# Patient Record
Sex: MALE | Race: WHITE | NOT HISPANIC OR LATINO | URBAN - METROPOLITAN AREA
[De-identification: names, ages, dates, MRNs, and addresses within clinical notes are randomized per-mention and may not be internally consistent; named-entity substitution may affect disease eponyms.]

---

## 2018-01-09 ENCOUNTER — ON CAMPUS - OUTPATIENT (AMBULATORY)
Dept: URBAN - METROPOLITAN AREA HOSPITAL 2 | Facility: HOSPITAL | Age: 78
End: 2018-01-09

## 2018-01-09 VITALS
HEART RATE: 53 BPM | HEART RATE: 51 BPM | HEART RATE: 52 BPM | TEMPERATURE: 97.7 F | DIASTOLIC BLOOD PRESSURE: 70 MMHG | HEART RATE: 55 BPM | HEIGHT: 66 IN | WEIGHT: 134.5 LBS | SYSTOLIC BLOOD PRESSURE: 141 MMHG | HEART RATE: 61 BPM | HEART RATE: 49 BPM | SYSTOLIC BLOOD PRESSURE: 142 MMHG | OXYGEN SATURATION: 100 % | DIASTOLIC BLOOD PRESSURE: 60 MMHG | DIASTOLIC BLOOD PRESSURE: 72 MMHG | DIASTOLIC BLOOD PRESSURE: 63 MMHG | HEART RATE: 57 BPM | SYSTOLIC BLOOD PRESSURE: 139 MMHG | SYSTOLIC BLOOD PRESSURE: 134 MMHG | RESPIRATION RATE: 16 BRPM | SYSTOLIC BLOOD PRESSURE: 151 MMHG | DIASTOLIC BLOOD PRESSURE: 45 MMHG | SYSTOLIC BLOOD PRESSURE: 123 MMHG | SYSTOLIC BLOOD PRESSURE: 118 MMHG | RESPIRATION RATE: 18 BRPM | RESPIRATION RATE: 17 BRPM | DIASTOLIC BLOOD PRESSURE: 59 MMHG

## 2018-01-09 DIAGNOSIS — K57.30 DIVERTICULOSIS OF LARGE INTESTINE WITHOUT PERFORATION OR ABS: ICD-10-CM

## 2018-01-09 DIAGNOSIS — D50.0 IRON DEFICIENCY ANEMIA SECONDARY TO BLOOD LOSS (CHRONIC): ICD-10-CM

## 2018-01-09 DIAGNOSIS — R19.4 CHANGE IN BOWEL HABIT: ICD-10-CM

## 2018-01-09 PROCEDURE — 45378 DIAGNOSTIC COLONOSCOPY: CPT

## 2018-01-09 PROCEDURE — 43235 EGD DIAGNOSTIC BRUSH WASH: CPT

## 2018-01-09 RX ADMIN — PROPOFOL: 10 INJECTION, EMULSION INTRAVENOUS at 08:40

## 2018-01-18 ENCOUNTER — OFFICE (AMBULATORY)
Dept: URBAN - METROPOLITAN AREA CLINIC 64 | Facility: CLINIC | Age: 78
End: 2018-01-18

## 2018-01-18 DIAGNOSIS — R19.4 CHANGE IN BOWEL HABIT: ICD-10-CM

## 2018-01-18 DIAGNOSIS — H02.60 XANTHELASMA OF UNSPECIFIED EYE, UNSPECIFIED EYELID: ICD-10-CM

## 2018-01-18 DIAGNOSIS — D50.9 IRON DEFICIENCY ANEMIA, UNSPECIFIED: ICD-10-CM

## 2018-01-18 DIAGNOSIS — Z98.890 OTHER SPECIFIED POSTPROCEDURAL STATES: ICD-10-CM

## 2018-01-18 DIAGNOSIS — Q27.33 ARTERIOVENOUS MALFORMATION OF DIGESTIVE SYSTEM VESSEL: ICD-10-CM

## 2018-01-18 PROCEDURE — 91110 GI TRC IMG INTRAL ESOPH-ILE: CPT

## 2019-11-15 ENCOUNTER — TRANSCRIBE ORDERS (OUTPATIENT)
Dept: ADMINISTRATIVE | Facility: HOSPITAL | Age: 79
End: 2019-11-15

## 2019-11-15 ENCOUNTER — HOSPITAL ENCOUNTER (OUTPATIENT)
Dept: CARDIOLOGY | Facility: HOSPITAL | Age: 79
Discharge: HOME | End: 2019-11-15
Admitting: ORTHOPAEDIC SURGERY

## 2019-11-15 ENCOUNTER — HOSPITAL ENCOUNTER (OUTPATIENT)
Dept: GENERAL RADIOLOGY | Facility: HOSPITAL | Age: 79
Discharge: HOME | End: 2019-11-15

## 2019-11-15 ENCOUNTER — LAB (OUTPATIENT)
Dept: LAB | Facility: HOSPITAL | Age: 79
End: 2019-11-15

## 2019-11-15 DIAGNOSIS — Z01.818 PRE-OP TESTING: ICD-10-CM

## 2019-11-15 DIAGNOSIS — Z01.818 PRE-OP TESTING: Primary | ICD-10-CM

## 2019-11-15 LAB
ABO GROUP BLD: NORMAL
ANION GAP SERPL CALCULATED.3IONS-SCNC: 13.1 MMOL/L (ref 5–15)
BASOPHILS # BLD AUTO: 0.07 10*3/MM3 (ref 0–0.2)
BASOPHILS NFR BLD AUTO: 1.3 % (ref 0–1.5)
BLD GP AB SCN SERPL QL: NEGATIVE
BUN BLD-MCNC: 17 MG/DL (ref 8–23)
BUN/CREAT SERPL: 15.6 (ref 7–25)
CALCIUM SPEC-SCNC: 9.7 MG/DL (ref 8.6–10.5)
CHLORIDE SERPL-SCNC: 102 MMOL/L (ref 98–107)
CO2 SERPL-SCNC: 26.9 MMOL/L (ref 22–29)
CREAT BLD-MCNC: 1.09 MG/DL (ref 0.76–1.27)
DEPRECATED RDW RBC AUTO: 43.6 FL (ref 37–54)
EOSINOPHIL # BLD AUTO: 0.15 10*3/MM3 (ref 0–0.4)
EOSINOPHIL NFR BLD AUTO: 2.7 % (ref 0.3–6.2)
ERYTHROCYTE [DISTWIDTH] IN BLOOD BY AUTOMATED COUNT: 12.9 % (ref 12.3–15.4)
GFR SERPL CREATININE-BSD FRML MDRD: 65 ML/MIN/1.73
GLUCOSE BLD-MCNC: 82 MG/DL (ref 65–99)
HCT VFR BLD AUTO: 39.9 % (ref 37.5–51)
HGB BLD-MCNC: 12.9 G/DL (ref 13–17.7)
IMM GRANULOCYTES # BLD AUTO: 0.03 10*3/MM3 (ref 0–0.05)
IMM GRANULOCYTES NFR BLD AUTO: 0.5 % (ref 0–0.5)
LYMPHOCYTES # BLD AUTO: 0.73 10*3/MM3 (ref 0.7–3.1)
LYMPHOCYTES NFR BLD AUTO: 13.1 % (ref 19.6–45.3)
MCH RBC QN AUTO: 29.9 PG (ref 26.6–33)
MCHC RBC AUTO-ENTMCNC: 32.3 G/DL (ref 31.5–35.7)
MCV RBC AUTO: 92.4 FL (ref 79–97)
MONOCYTES # BLD AUTO: 0.53 10*3/MM3 (ref 0.1–0.9)
MONOCYTES NFR BLD AUTO: 9.5 % (ref 5–12)
NEUTROPHILS # BLD AUTO: 4.08 10*3/MM3 (ref 1.7–7)
NEUTROPHILS NFR BLD AUTO: 72.9 % (ref 42.7–76)
NRBC BLD AUTO-RTO: 0 /100 WBC (ref 0–0.2)
PLATELET # BLD AUTO: 221 10*3/MM3 (ref 140–450)
PMV BLD AUTO: 10 FL (ref 6–12)
POTASSIUM BLD-SCNC: 4.2 MMOL/L (ref 3.5–5.2)
RBC # BLD AUTO: 4.32 10*6/MM3 (ref 4.14–5.8)
RH BLD: POSITIVE
SODIUM BLD-SCNC: 142 MMOL/L (ref 136–145)
T&S EXPIRATION DATE: NORMAL
WBC NRBC COR # BLD: 5.59 10*3/MM3 (ref 3.4–10.8)

## 2019-11-15 PROCEDURE — 36415 COLL VENOUS BLD VENIPUNCTURE: CPT

## 2019-11-15 PROCEDURE — 80048 BASIC METABOLIC PNL TOTAL CA: CPT

## 2019-11-15 PROCEDURE — 86901 BLOOD TYPING SEROLOGIC RH(D): CPT

## 2019-11-15 PROCEDURE — 85025 COMPLETE CBC W/AUTO DIFF WBC: CPT

## 2019-11-15 PROCEDURE — 86850 RBC ANTIBODY SCREEN: CPT | Performed by: ORTHOPAEDIC SURGERY

## 2019-11-15 PROCEDURE — 86900 BLOOD TYPING SEROLOGIC ABO: CPT

## 2019-11-15 PROCEDURE — 93005 ELECTROCARDIOGRAM TRACING: CPT | Performed by: ORTHOPAEDIC SURGERY

## 2019-11-15 PROCEDURE — 86900 BLOOD TYPING SEROLOGIC ABO: CPT | Performed by: ORTHOPAEDIC SURGERY

## 2019-11-15 PROCEDURE — 86901 BLOOD TYPING SEROLOGIC RH(D): CPT | Performed by: ORTHOPAEDIC SURGERY

## 2019-11-15 PROCEDURE — 71046 X-RAY EXAM CHEST 2 VIEWS: CPT

## 2019-12-01 PROCEDURE — 93010 ELECTROCARDIOGRAM REPORT: CPT | Performed by: INTERNAL MEDICINE

## 2020-02-25 ENCOUNTER — LAB (OUTPATIENT)
Dept: LAB | Facility: HOSPITAL | Age: 80
End: 2020-02-25

## 2020-02-25 ENCOUNTER — HOSPITAL ENCOUNTER (OUTPATIENT)
Dept: CARDIOLOGY | Facility: HOSPITAL | Age: 80
Discharge: HOME | End: 2020-02-25
Admitting: SURGERY

## 2020-02-25 ENCOUNTER — TRANSCRIBE ORDERS (OUTPATIENT)
Dept: ADMINISTRATIVE | Facility: HOSPITAL | Age: 80
End: 2020-02-25

## 2020-02-25 DIAGNOSIS — Z01.818 PRE-OP TESTING: ICD-10-CM

## 2020-02-25 DIAGNOSIS — Z01.818 PRE-OP TESTING: Primary | ICD-10-CM

## 2020-02-25 LAB
ANION GAP SERPL CALCULATED.3IONS-SCNC: 10.6 MMOL/L (ref 5–15)
BASOPHILS # BLD AUTO: 0.06 10*3/MM3 (ref 0–0.2)
BASOPHILS NFR BLD AUTO: 1.3 % (ref 0–1.5)
BUN BLD-MCNC: 20 MG/DL (ref 8–23)
BUN/CREAT SERPL: 15.7 (ref 7–25)
CALCIUM SPEC-SCNC: 9 MG/DL (ref 8.6–10.5)
CHLORIDE SERPL-SCNC: 98 MMOL/L (ref 98–107)
CO2 SERPL-SCNC: 30.4 MMOL/L (ref 22–29)
CREAT BLD-MCNC: 1.27 MG/DL (ref 0.76–1.27)
DEPRECATED RDW RBC AUTO: 46.6 FL (ref 37–54)
EOSINOPHIL # BLD AUTO: 0.14 10*3/MM3 (ref 0–0.4)
EOSINOPHIL NFR BLD AUTO: 2.9 % (ref 0.3–6.2)
ERYTHROCYTE [DISTWIDTH] IN BLOOD BY AUTOMATED COUNT: 14.4 % (ref 12.3–15.4)
GFR SERPL CREATININE-BSD FRML MDRD: 55 ML/MIN/1.73
GLUCOSE BLD-MCNC: 117 MG/DL (ref 65–99)
HCT VFR BLD AUTO: 35.2 % (ref 37.5–51)
HGB BLD-MCNC: 11.3 G/DL (ref 13–17.7)
IMM GRANULOCYTES # BLD AUTO: 0.01 10*3/MM3 (ref 0–0.05)
IMM GRANULOCYTES NFR BLD AUTO: 0.2 % (ref 0–0.5)
LYMPHOCYTES # BLD AUTO: 0.92 10*3/MM3 (ref 0.7–3.1)
LYMPHOCYTES NFR BLD AUTO: 19.3 % (ref 19.6–45.3)
MCH RBC QN AUTO: 28.9 PG (ref 26.6–33)
MCHC RBC AUTO-ENTMCNC: 32.1 G/DL (ref 31.5–35.7)
MCV RBC AUTO: 90 FL (ref 79–97)
MONOCYTES # BLD AUTO: 0.45 10*3/MM3 (ref 0.1–0.9)
MONOCYTES NFR BLD AUTO: 9.4 % (ref 5–12)
NEUTROPHILS # BLD AUTO: 3.19 10*3/MM3 (ref 1.7–7)
NEUTROPHILS NFR BLD AUTO: 66.9 % (ref 42.7–76)
NRBC BLD AUTO-RTO: 0 /100 WBC (ref 0–0.2)
PLATELET # BLD AUTO: 232 10*3/MM3 (ref 140–450)
PMV BLD AUTO: 8.9 FL (ref 6–12)
POTASSIUM BLD-SCNC: 4.2 MMOL/L (ref 3.5–5.2)
RBC # BLD AUTO: 3.91 10*6/MM3 (ref 4.14–5.8)
SODIUM BLD-SCNC: 139 MMOL/L (ref 136–145)
WBC NRBC COR # BLD: 4.77 10*3/MM3 (ref 3.4–10.8)

## 2020-02-25 PROCEDURE — 80048 BASIC METABOLIC PNL TOTAL CA: CPT

## 2020-02-25 PROCEDURE — 36415 COLL VENOUS BLD VENIPUNCTURE: CPT

## 2020-02-25 PROCEDURE — 85025 COMPLETE CBC W/AUTO DIFF WBC: CPT

## 2020-02-25 PROCEDURE — 93005 ELECTROCARDIOGRAM TRACING: CPT | Performed by: SURGERY

## 2020-03-27 PROCEDURE — 93010 ELECTROCARDIOGRAM REPORT: CPT | Performed by: INTERNAL MEDICINE

## 2022-06-09 ENCOUNTER — OFFICE (AMBULATORY)
Dept: URBAN - METROPOLITAN AREA CLINIC 64 | Facility: CLINIC | Age: 82
End: 2022-06-09

## 2022-06-09 VITALS
DIASTOLIC BLOOD PRESSURE: 90 MMHG | HEART RATE: 62 BPM | WEIGHT: 137 LBS | SYSTOLIC BLOOD PRESSURE: 160 MMHG | HEIGHT: 66 IN

## 2022-06-09 DIAGNOSIS — R15.9 FULL INCONTINENCE OF FECES: ICD-10-CM

## 2022-06-09 DIAGNOSIS — R14.3 FLATULENCE: ICD-10-CM

## 2022-06-09 PROCEDURE — 99204 OFFICE O/P NEW MOD 45 MIN: CPT | Performed by: INTERNAL MEDICINE

## 2022-06-09 RX ORDER — SORBITOL SOLUTION 70 %
SOLUTION, ORAL MISCELLANEOUS
Qty: 100 | Refills: 0 | Status: COMPLETED
Start: 2022-06-09 | End: 2022-09-06

## 2022-09-06 ENCOUNTER — OFFICE (AMBULATORY)
Dept: URBAN - METROPOLITAN AREA CLINIC 64 | Facility: CLINIC | Age: 82
End: 2022-09-06

## 2022-09-06 VITALS
DIASTOLIC BLOOD PRESSURE: 89 MMHG | WEIGHT: 136 LBS | SYSTOLIC BLOOD PRESSURE: 169 MMHG | HEIGHT: 66 IN | HEART RATE: 61 BPM

## 2022-09-06 DIAGNOSIS — R15.9 FULL INCONTINENCE OF FECES: ICD-10-CM

## 2022-09-06 PROCEDURE — 99214 OFFICE O/P EST MOD 30 MIN: CPT | Performed by: INTERNAL MEDICINE

## 2022-12-06 ENCOUNTER — OFFICE (AMBULATORY)
Dept: URBAN - METROPOLITAN AREA CLINIC 64 | Facility: CLINIC | Age: 82
End: 2022-12-06

## 2022-12-06 VITALS
DIASTOLIC BLOOD PRESSURE: 100 MMHG | HEART RATE: 71 BPM | WEIGHT: 140 LBS | SYSTOLIC BLOOD PRESSURE: 188 MMHG | HEIGHT: 66 IN

## 2022-12-06 DIAGNOSIS — K59.00 CONSTIPATION, UNSPECIFIED: ICD-10-CM

## 2022-12-06 DIAGNOSIS — R15.9 FULL INCONTINENCE OF FECES: ICD-10-CM

## 2022-12-06 PROCEDURE — 99213 OFFICE O/P EST LOW 20 MIN: CPT | Performed by: INTERNAL MEDICINE

## 2023-03-14 ENCOUNTER — OFFICE (AMBULATORY)
Dept: URBAN - METROPOLITAN AREA CLINIC 64 | Facility: CLINIC | Age: 83
End: 2023-03-14

## 2023-03-14 VITALS
SYSTOLIC BLOOD PRESSURE: 184 MMHG | HEIGHT: 66 IN | WEIGHT: 134 LBS | DIASTOLIC BLOOD PRESSURE: 99 MMHG | HEART RATE: 61 BPM

## 2023-03-14 DIAGNOSIS — R15.9 FULL INCONTINENCE OF FECES: ICD-10-CM

## 2023-03-14 DIAGNOSIS — K59.00 CONSTIPATION, UNSPECIFIED: ICD-10-CM

## 2023-03-14 PROCEDURE — 99213 OFFICE O/P EST LOW 20 MIN: CPT | Performed by: NURSE PRACTITIONER

## 2023-07-25 ENCOUNTER — APPOINTMENT (OUTPATIENT)
Dept: GENERAL RADIOLOGY | Facility: HOSPITAL | Age: 83
End: 2023-07-25
Payer: MEDICARE

## 2023-07-25 ENCOUNTER — HOSPITAL ENCOUNTER (EMERGENCY)
Facility: HOSPITAL | Age: 83
Discharge: HOME | End: 2023-07-25
Attending: EMERGENCY MEDICINE | Admitting: EMERGENCY MEDICINE
Payer: MEDICARE

## 2023-07-25 ENCOUNTER — APPOINTMENT (OUTPATIENT)
Dept: CT IMAGING | Facility: HOSPITAL | Age: 83
End: 2023-07-25
Payer: MEDICARE

## 2023-07-25 VITALS
WEIGHT: 133.6 LBS | DIASTOLIC BLOOD PRESSURE: 90 MMHG | HEART RATE: 92 BPM | RESPIRATION RATE: 18 BRPM | OXYGEN SATURATION: 98 % | HEIGHT: 67 IN | SYSTOLIC BLOOD PRESSURE: 176 MMHG | BODY MASS INDEX: 20.97 KG/M2 | TEMPERATURE: 98.1 F

## 2023-07-25 DIAGNOSIS — S09.90XA INJURY OF HEAD, INITIAL ENCOUNTER: ICD-10-CM
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
## 2023-07-25 DIAGNOSIS — M79.642 LEFT HAND PAIN: ICD-10-CM

## 2023-07-25 DIAGNOSIS — S80.211A ABRASION OF RIGHT KNEE, INITIAL ENCOUNTER: ICD-10-CM

## 2023-07-25 DIAGNOSIS — S01.81XA LACERATION OF FOREHEAD, INITIAL ENCOUNTER: ICD-10-CM

## 2023-07-25 PROCEDURE — 70450 CT HEAD/BRAIN W/O DYE: CPT

## 2023-07-25 PROCEDURE — 99282 EMERGENCY DEPT VISIT SF MDM: CPT

## 2023-07-25 PROCEDURE — 73562 X-RAY EXAM OF KNEE 3: CPT

## 2023-07-25 PROCEDURE — 72125 CT NECK SPINE W/O DYE: CPT

## 2023-07-25 PROCEDURE — 73130 X-RAY EXAM OF HAND: CPT

## 2023-08-22 ENCOUNTER — OFFICE (AMBULATORY)
Dept: URBAN - METROPOLITAN AREA CLINIC 64 | Facility: CLINIC | Age: 83
End: 2023-08-22

## 2023-08-22 VITALS
SYSTOLIC BLOOD PRESSURE: 163 MMHG | WEIGHT: 133 LBS | HEART RATE: 66 BPM | HEIGHT: 66 IN | DIASTOLIC BLOOD PRESSURE: 90 MMHG

## 2023-08-22 DIAGNOSIS — K59.00 CONSTIPATION, UNSPECIFIED: ICD-10-CM

## 2023-08-22 DIAGNOSIS — K30 FUNCTIONAL DYSPEPSIA: ICD-10-CM

## 2023-08-22 DIAGNOSIS — R15.9 FULL INCONTINENCE OF FECES: ICD-10-CM

## 2023-08-22 PROCEDURE — 99214 OFFICE O/P EST MOD 30 MIN: CPT | Performed by: INTERNAL MEDICINE

## 2023-08-22 RX ORDER — MIRTAZAPINE 15 MG/1
TABLET, FILM COATED ORAL
Qty: 30 | Refills: 1 | Status: ACTIVE
Start: 2023-08-22

## 2023-10-25 ENCOUNTER — OFFICE (AMBULATORY)
Dept: URBAN - METROPOLITAN AREA CLINIC 64 | Facility: CLINIC | Age: 83
End: 2023-10-25

## 2023-10-25 VITALS
DIASTOLIC BLOOD PRESSURE: 87 MMHG | HEART RATE: 59 BPM | HEIGHT: 66 IN | WEIGHT: 126 LBS | SYSTOLIC BLOOD PRESSURE: 143 MMHG

## 2023-10-25 DIAGNOSIS — K59.00 CONSTIPATION, UNSPECIFIED: ICD-10-CM

## 2023-10-25 DIAGNOSIS — R63.4 ABNORMAL WEIGHT LOSS: ICD-10-CM

## 2023-10-25 DIAGNOSIS — R15.9 FULL INCONTINENCE OF FECES: ICD-10-CM

## 2023-10-25 DIAGNOSIS — R10.13 EPIGASTRIC PAIN: ICD-10-CM

## 2023-10-25 PROCEDURE — 99214 OFFICE O/P EST MOD 30 MIN: CPT | Performed by: INTERNAL MEDICINE

## 2023-11-30 ENCOUNTER — INPATIENT HOSPITAL (AMBULATORY)
Dept: URBAN - METROPOLITAN AREA HOSPITAL 76 | Facility: HOSPITAL | Age: 83
End: 2023-11-30
Payer: COMMERCIAL

## 2023-11-30 DIAGNOSIS — D64.9 ANEMIA, UNSPECIFIED: ICD-10-CM

## 2023-11-30 PROCEDURE — 99222 1ST HOSP IP/OBS MODERATE 55: CPT | Mod: FS

## 2023-12-01 ENCOUNTER — INPATIENT HOSPITAL (AMBULATORY)
Dept: URBAN - METROPOLITAN AREA HOSPITAL 76 | Facility: HOSPITAL | Age: 83
End: 2023-12-01
Payer: COMMERCIAL

## 2023-12-01 DIAGNOSIS — K59.00 CONSTIPATION, UNSPECIFIED: ICD-10-CM

## 2023-12-01 DIAGNOSIS — D64.9 ANEMIA, UNSPECIFIED: ICD-10-CM

## 2023-12-01 DIAGNOSIS — R06.02 SHORTNESS OF BREATH: ICD-10-CM

## 2023-12-01 PROCEDURE — 99232 SBSQ HOSP IP/OBS MODERATE 35: CPT | Mod: FS

## 2024-07-31 ENCOUNTER — HOSPITAL ENCOUNTER (INPATIENT)
Facility: HOSPITAL | Age: 84
LOS: 3 days | Discharge: NURSING, REHAB OR LONG-TERM CARE | End: 2024-08-06
Attending: EMERGENCY MEDICINE | Admitting: STUDENT IN AN ORGANIZED HEALTH CARE EDUCATION/TRAINING PROGRAM
Payer: MEDICARE

## 2024-07-31 DIAGNOSIS — I50.9 ACUTE ON CHRONIC CONGESTIVE HEART FAILURE, UNSPECIFIED HEART FAILURE TYPE: ICD-10-CM

## 2024-07-31 DIAGNOSIS — R41.82 ALTERED MENTAL STATUS, UNSPECIFIED ALTERED MENTAL STATUS TYPE: Primary | ICD-10-CM

## 2024-07-31 PROCEDURE — 99285 EMERGENCY DEPT VISIT HI MDM: CPT

## 2024-07-31 PROCEDURE — 36415 COLL VENOUS BLD VENIPUNCTURE: CPT

## 2024-07-31 PROCEDURE — 94761 N-INVAS EAR/PLS OXIMETRY MLT: CPT

## 2024-07-31 PROCEDURE — P9612 CATHETERIZE FOR URINE SPEC: HCPCS

## 2024-07-31 RX ORDER — SODIUM CHLORIDE 0.9 % (FLUSH) 0.9 %
10 SYRINGE (ML) INJECTION AS NEEDED
Status: DISCONTINUED | OUTPATIENT
Start: 2024-07-31 | End: 2024-08-06 | Stop reason: HOSPADM

## 2024-08-01 ENCOUNTER — APPOINTMENT (OUTPATIENT)
Dept: CT IMAGING | Facility: HOSPITAL | Age: 84
End: 2024-08-01
Payer: MEDICARE

## 2024-08-01 ENCOUNTER — APPOINTMENT (OUTPATIENT)
Dept: GENERAL RADIOLOGY | Facility: HOSPITAL | Age: 84
End: 2024-08-01
Payer: MEDICARE

## 2024-08-01 ENCOUNTER — APPOINTMENT (OUTPATIENT)
Dept: CARDIOLOGY | Facility: HOSPITAL | Age: 84
End: 2024-08-01
Payer: MEDICARE

## 2024-08-01 PROBLEM — I25.10 CAD (CORONARY ARTERY DISEASE): Status: ACTIVE | Noted: 2024-08-01

## 2024-08-01 PROBLEM — I50.9 HEART FAILURE, UNSPECIFIED: Status: ACTIVE | Noted: 2024-06-27

## 2024-08-01 PROBLEM — I50.9 CHF EXACERBATION: Status: ACTIVE | Noted: 2024-08-01

## 2024-08-01 LAB
ACANTHOCYTES BLD QL SMEAR: NORMAL
ALBUMIN SERPL-MCNC: 3.5 G/DL (ref 3.5–5.2)
ALBUMIN/GLOB SERPL: 1.3 G/DL
ALP SERPL-CCNC: 149 U/L (ref 39–117)
ALT SERPL W P-5'-P-CCNC: 10 U/L (ref 1–41)
ANION GAP SERPL CALCULATED.3IONS-SCNC: 10.6 MMOL/L (ref 5–15)
ANION GAP SERPL CALCULATED.3IONS-SCNC: 9.5 MMOL/L (ref 5–15)
AORTIC DIMENSIONLESS INDEX: 0.24 (DI)
ARTERIAL PATENCY WRIST A: POSITIVE
AST SERPL-CCNC: 32 U/L (ref 1–40)
ATMOSPHERIC PRESS: ABNORMAL MM[HG]
BACTERIA UR QL AUTO: ABNORMAL /HPF
BASE EXCESS BLDA CALC-SCNC: 0.2 MMOL/L (ref 0–3)
BASOPHILS # BLD AUTO: 0.02 10*3/MM3 (ref 0–0.2)
BASOPHILS NFR BLD AUTO: 0.2 % (ref 0–1.5)
BDY SITE: ABNORMAL
BH CV ECHO LEFT VENTRICLE GLOBAL LONGITUDINAL STRAIN: -8.4 %
BH CV ECHO MEAS - ACS: 0.9 CM
BH CV ECHO MEAS - AO MAX PG: 29.8 MMHG
BH CV ECHO MEAS - AO MEAN PG: 17 MMHG
BH CV ECHO MEAS - AO V2 MAX: 273 CM/SEC
BH CV ECHO MEAS - AO V2 VTI: 67 CM
BH CV ECHO MEAS - AVA(I,D): 0.73 CM2
BH CV ECHO MEAS - EDV(CUBED): 140.6 ML
BH CV ECHO MEAS - EDV(MOD-SP2): 79.1 ML
BH CV ECHO MEAS - EDV(MOD-SP4): 80.8 ML
BH CV ECHO MEAS - EF(MOD-BP): 33.9 %
BH CV ECHO MEAS - EF(MOD-SP2): 35.3 %
BH CV ECHO MEAS - EF(MOD-SP4): 34.5 %
BH CV ECHO MEAS - ESV(CUBED): 97.3 ML
BH CV ECHO MEAS - ESV(MOD-SP2): 51.2 ML
BH CV ECHO MEAS - ESV(MOD-SP4): 52.9 ML
BH CV ECHO MEAS - FS: 11.5 %
BH CV ECHO MEAS - IVS/LVPW: 1.33 CM
BH CV ECHO MEAS - IVSD: 1.2 CM
BH CV ECHO MEAS - LA DIMENSION: 4.5 CM
BH CV ECHO MEAS - LAT PEAK E' VEL: 5.1 CM/SEC
BH CV ECHO MEAS - LV DIASTOLIC VOL/BSA (35-75): 48.5 CM2
BH CV ECHO MEAS - LV MASS(C)D: 207.3 GRAMS
BH CV ECHO MEAS - LV MAX PG: 1.73 MMHG
BH CV ECHO MEAS - LV MEAN PG: 1 MMHG
BH CV ECHO MEAS - LV SYSTOLIC VOL/BSA (12-30): 31.7 CM2
BH CV ECHO MEAS - LV V1 MAX: 65.7 CM/SEC
BH CV ECHO MEAS - LV V1 VTI: 15.6 CM
BH CV ECHO MEAS - LVIDD: 5.2 CM
BH CV ECHO MEAS - LVIDS: 4.6 CM
BH CV ECHO MEAS - LVOT AREA: 3.1 CM2
BH CV ECHO MEAS - LVOT DIAM: 2 CM
BH CV ECHO MEAS - LVPWD: 0.9 CM
BH CV ECHO MEAS - MED PEAK E' VEL: 4.7 CM/SEC
BH CV ECHO MEAS - MR MAX PG: 97.6 MMHG
BH CV ECHO MEAS - MR MAX VEL: 494 CM/SEC
BH CV ECHO MEAS - MR MEAN PG: 61 MMHG
BH CV ECHO MEAS - MR MEAN VEL: 367 CM/SEC
BH CV ECHO MEAS - MR VTI: 186 CM
BH CV ECHO MEAS - MV A MAX VEL: 55.9 CM/SEC
BH CV ECHO MEAS - MV DEC SLOPE: 109 CM/SEC2
BH CV ECHO MEAS - MV DEC TIME: 0.27 SEC
BH CV ECHO MEAS - MV E MAX VEL: 49.1 CM/SEC
BH CV ECHO MEAS - MV E/A: 0.88
BH CV ECHO MEAS - MV MAX PG: 1.61 MMHG
BH CV ECHO MEAS - MV MEAN PG: 1 MMHG
BH CV ECHO MEAS - MV P1/2T: 177.9 MSEC
BH CV ECHO MEAS - MV V2 VTI: 29.2 CM
BH CV ECHO MEAS - MVA(P1/2T): 1.24 CM2
BH CV ECHO MEAS - MVA(VTI): 1.68 CM2
BH CV ECHO MEAS - PA ACC TIME: 0.1 SEC
BH CV ECHO MEAS - PA V2 MAX: 87.3 CM/SEC
BH CV ECHO MEAS - PI END-D VEL: 119.5 CM/SEC
BH CV ECHO MEAS - PULM A REVS DUR: 0.12 SEC
BH CV ECHO MEAS - PULM A REVS VEL: 14.4 CM/SEC
BH CV ECHO MEAS - PULM DIAS VEL: 28.9 CM/SEC
BH CV ECHO MEAS - PULM S/D: 1.29
BH CV ECHO MEAS - PULM SYS VEL: 37.4 CM/SEC
BH CV ECHO MEAS - RAP SYSTOLE: 8 MMHG
BH CV ECHO MEAS - RV MAX PG: 2.07 MMHG
BH CV ECHO MEAS - RV V1 MAX: 71.9 CM/SEC
BH CV ECHO MEAS - RV V1 VTI: 15 CM
BH CV ECHO MEAS - RVDD: 3.1 CM
BH CV ECHO MEAS - RVSP: 36.7 MMHG
BH CV ECHO MEAS - SV(LVOT): 49 ML
BH CV ECHO MEAS - SV(MOD-SP2): 27.9 ML
BH CV ECHO MEAS - SV(MOD-SP4): 27.9 ML
BH CV ECHO MEAS - SVI(LVOT): 29.4 ML/M2
BH CV ECHO MEAS - SVI(MOD-SP2): 16.7 ML/M2
BH CV ECHO MEAS - SVI(MOD-SP4): 16.7 ML/M2
BH CV ECHO MEAS - TAPSE (>1.6): 1.56 CM
BH CV ECHO MEAS - TR MAX PG: 28.7 MMHG
BH CV ECHO MEAS - TR MAX VEL: 268 CM/SEC
BH CV ECHO MEASUREMENTS AVERAGE E/E' RATIO: 10.02
BH CV XLRA - TDI S': 9.4 CM/SEC
BILIRUB SERPL-MCNC: 0.7 MG/DL (ref 0–1.2)
BILIRUB UR QL STRIP: NEGATIVE
BUN SERPL-MCNC: 22 MG/DL (ref 8–23)
BUN SERPL-MCNC: 22 MG/DL (ref 8–23)
BUN/CREAT SERPL: 15.6 (ref 7–25)
BUN/CREAT SERPL: 17.5 (ref 7–25)
BURR CELLS BLD QL SMEAR: NORMAL
CALCIUM SPEC-SCNC: 8.1 MG/DL (ref 8.6–10.5)
CALCIUM SPEC-SCNC: 9.1 MG/DL (ref 8.6–10.5)
CHLORIDE SERPL-SCNC: 104 MMOL/L (ref 98–107)
CHLORIDE SERPL-SCNC: 106 MMOL/L (ref 98–107)
CLARITY UR: CLEAR
CO2 BLDA-SCNC: 27.9 MMOL/L (ref 22–29)
CO2 SERPL-SCNC: 24.4 MMOL/L (ref 22–29)
CO2 SERPL-SCNC: 25.5 MMOL/L (ref 22–29)
COLOR UR: ABNORMAL
CREAT SERPL-MCNC: 1.26 MG/DL (ref 0.76–1.27)
CREAT SERPL-MCNC: 1.41 MG/DL (ref 0.76–1.27)
D-LACTATE SERPL-SCNC: 1.6 MMOL/L (ref 0.3–2)
DEPRECATED RDW RBC AUTO: 55.4 FL (ref 37–54)
EGFRCR SERPLBLD CKD-EPI 2021: 48.8 ML/MIN/1.73
EGFRCR SERPLBLD CKD-EPI 2021: 55.9 ML/MIN/1.73
EOSINOPHIL # BLD AUTO: 0.03 10*3/MM3 (ref 0–0.4)
EOSINOPHIL NFR BLD AUTO: 0.3 % (ref 0.3–6.2)
ERYTHROCYTE [DISTWIDTH] IN BLOOD BY AUTOMATED COUNT: 15.7 % (ref 12.3–15.4)
GEN 5 2HR TROPONIN T REFLEX: 137 NG/L
GLOBULIN UR ELPH-MCNC: 2.6 GM/DL
GLUCOSE SERPL-MCNC: 85 MG/DL (ref 65–99)
GLUCOSE SERPL-MCNC: 86 MG/DL (ref 65–99)
GLUCOSE UR STRIP-MCNC: NEGATIVE MG/DL
HCO3 BLDA-SCNC: 26.4 MMOL/L (ref 21–28)
HCT VFR BLD AUTO: 37.1 % (ref 37.5–51)
HEMODILUTION: NO
HGB BLD-MCNC: 11.4 G/DL (ref 13–17.7)
HGB UR QL STRIP.AUTO: ABNORMAL
HOLD SPECIMEN: NORMAL
HOLD SPECIMEN: NORMAL
HYALINE CASTS UR QL AUTO: ABNORMAL /LPF
IMM GRANULOCYTES # BLD AUTO: 0.04 10*3/MM3 (ref 0–0.05)
IMM GRANULOCYTES NFR BLD AUTO: 0.4 % (ref 0–0.5)
INHALED O2 CONCENTRATION: 36 %
KETONES UR QL STRIP: ABNORMAL
LEFT ATRIUM VOLUME INDEX: 29.7 ML/M2
LEUKOCYTE ESTERASE UR QL STRIP.AUTO: ABNORMAL
LYMPHOCYTES # BLD AUTO: 0.62 10*3/MM3 (ref 0.7–3.1)
LYMPHOCYTES NFR BLD AUTO: 6.9 % (ref 19.6–45.3)
MCH RBC QN AUTO: 29.6 PG (ref 26.6–33)
MCHC RBC AUTO-ENTMCNC: 30.7 G/DL (ref 31.5–35.7)
MCV RBC AUTO: 96.4 FL (ref 79–97)
MODALITY: ABNORMAL
MONOCYTES # BLD AUTO: 0.44 10*3/MM3 (ref 0.1–0.9)
MONOCYTES NFR BLD AUTO: 4.9 % (ref 5–12)
NEUTROPHILS NFR BLD AUTO: 7.8 10*3/MM3 (ref 1.7–7)
NEUTROPHILS NFR BLD AUTO: 87.3 % (ref 42.7–76)
NITRITE UR QL STRIP: NEGATIVE
NRBC BLD AUTO-RTO: 0 /100 WBC (ref 0–0.2)
NT-PROBNP SERPL-MCNC: ABNORMAL PG/ML (ref 0–1800)
PCO2 BLDA: 48.9 MM HG (ref 35–48)
PH BLDA: 7.34 PH UNITS (ref 7.35–7.45)
PH UR STRIP.AUTO: 5.5 [PH] (ref 5–8)
PLATELET # BLD AUTO: 129 10*3/MM3 (ref 140–450)
PMV BLD AUTO: 10 FL (ref 6–12)
PO2 BLD: 287 MM[HG] (ref 0–500)
PO2 BLDA: 103.4 MM HG (ref 83–108)
POIKILOCYTOSIS BLD QL SMEAR: NORMAL
POTASSIUM SERPL-SCNC: 3.7 MMOL/L (ref 3.5–5.2)
POTASSIUM SERPL-SCNC: 4.2 MMOL/L (ref 3.5–5.2)
PROT SERPL-MCNC: 6.1 G/DL (ref 6–8.5)
PROT UR QL STRIP: ABNORMAL
QT INTERVAL: 461 MS
QTC INTERVAL: 508 MS
RBC # BLD AUTO: 3.85 10*6/MM3 (ref 4.14–5.8)
RBC # UR STRIP: ABNORMAL /HPF
REF LAB TEST METHOD: ABNORMAL
SAO2 % BLDCOA: 97.5 % (ref 94–98)
SARS-COV-2 RNA RESP QL NAA+PROBE: NOT DETECTED
SINUS: 2.8 CM
SMALL PLATELETS BLD QL SMEAR: NORMAL
SODIUM SERPL-SCNC: 139 MMOL/L (ref 136–145)
SODIUM SERPL-SCNC: 141 MMOL/L (ref 136–145)
SP GR UR STRIP: 1.01 (ref 1–1.03)
SQUAMOUS #/AREA URNS HPF: ABNORMAL /HPF
TROPONIN T DELTA: 27 NG/L
TROPONIN T SERPL HS-MCNC: 110 NG/L
UROBILINOGEN UR QL STRIP: ABNORMAL
WBC # UR STRIP: ABNORMAL /HPF
WBC MORPH BLD: NORMAL
WBC NRBC COR # BLD AUTO: 8.95 10*3/MM3 (ref 3.4–10.8)
WHOLE BLOOD HOLD COAG: NORMAL
WHOLE BLOOD HOLD SPECIMEN: NORMAL

## 2024-08-01 PROCEDURE — 93005 ELECTROCARDIOGRAM TRACING: CPT | Performed by: EMERGENCY MEDICINE

## 2024-08-01 PROCEDURE — 84484 ASSAY OF TROPONIN QUANT: CPT | Performed by: EMERGENCY MEDICINE

## 2024-08-01 PROCEDURE — 87635 SARS-COV-2 COVID-19 AMP PRB: CPT | Performed by: EMERGENCY MEDICINE

## 2024-08-01 PROCEDURE — 99204 OFFICE O/P NEW MOD 45 MIN: CPT | Performed by: INTERNAL MEDICINE

## 2024-08-01 PROCEDURE — 81001 URINALYSIS AUTO W/SCOPE: CPT | Performed by: EMERGENCY MEDICINE

## 2024-08-01 PROCEDURE — 85025 COMPLETE CBC W/AUTO DIFF WBC: CPT | Performed by: EMERGENCY MEDICINE

## 2024-08-01 PROCEDURE — 70450 CT HEAD/BRAIN W/O DYE: CPT

## 2024-08-01 PROCEDURE — 93356 MYOCRD STRAIN IMG SPCKL TRCK: CPT | Performed by: INTERNAL MEDICINE

## 2024-08-01 PROCEDURE — 85007 BL SMEAR W/DIFF WBC COUNT: CPT | Performed by: EMERGENCY MEDICINE

## 2024-08-01 PROCEDURE — 83880 ASSAY OF NATRIURETIC PEPTIDE: CPT | Performed by: EMERGENCY MEDICINE

## 2024-08-01 PROCEDURE — 25010000002 FUROSEMIDE PER 20 MG: Performed by: INTERNAL MEDICINE

## 2024-08-01 PROCEDURE — 82803 BLOOD GASES ANY COMBINATION: CPT | Performed by: EMERGENCY MEDICINE

## 2024-08-01 PROCEDURE — 93306 TTE W/DOPPLER COMPLETE: CPT

## 2024-08-01 PROCEDURE — 25010000002 FUROSEMIDE PER 20 MG: Performed by: EMERGENCY MEDICINE

## 2024-08-01 PROCEDURE — G0378 HOSPITAL OBSERVATION PER HR: HCPCS

## 2024-08-01 PROCEDURE — 80053 COMPREHEN METABOLIC PANEL: CPT | Performed by: EMERGENCY MEDICINE

## 2024-08-01 PROCEDURE — 71045 X-RAY EXAM CHEST 1 VIEW: CPT

## 2024-08-01 PROCEDURE — 36600 WITHDRAWAL OF ARTERIAL BLOOD: CPT | Performed by: EMERGENCY MEDICINE

## 2024-08-01 PROCEDURE — 93306 TTE W/DOPPLER COMPLETE: CPT | Performed by: INTERNAL MEDICINE

## 2024-08-01 PROCEDURE — 83605 ASSAY OF LACTIC ACID: CPT | Performed by: EMERGENCY MEDICINE

## 2024-08-01 PROCEDURE — 25010000002 FUROSEMIDE PER 20 MG

## 2024-08-01 PROCEDURE — 93356 MYOCRD STRAIN IMG SPCKL TRCK: CPT

## 2024-08-01 PROCEDURE — 87040 BLOOD CULTURE FOR BACTERIA: CPT | Performed by: EMERGENCY MEDICINE

## 2024-08-01 RX ORDER — POLYETHYLENE GLYCOL 3350 17 G/17G
17 POWDER, FOR SOLUTION ORAL DAILY PRN
Status: DISCONTINUED | OUTPATIENT
Start: 2024-08-01 | End: 2024-08-06 | Stop reason: HOSPADM

## 2024-08-01 RX ORDER — SODIUM CHLORIDE 9 MG/ML
40 INJECTION, SOLUTION INTRAVENOUS AS NEEDED
Status: DISCONTINUED | OUTPATIENT
Start: 2024-08-01 | End: 2024-08-06 | Stop reason: HOSPADM

## 2024-08-01 RX ORDER — ONDANSETRON 2 MG/ML
4 INJECTION INTRAMUSCULAR; INTRAVENOUS EVERY 6 HOURS PRN
Status: DISCONTINUED | OUTPATIENT
Start: 2024-08-01 | End: 2024-08-06 | Stop reason: HOSPADM

## 2024-08-01 RX ORDER — MIDODRINE HYDROCHLORIDE 5 MG/1
5 TABLET ORAL
Status: DISCONTINUED | OUTPATIENT
Start: 2024-08-01 | End: 2024-08-02

## 2024-08-01 RX ORDER — DOCUSATE SODIUM 100 MG/1
100 CAPSULE, LIQUID FILLED ORAL DAILY
COMMUNITY

## 2024-08-01 RX ORDER — NITROGLYCERIN 0.4 MG/1
0.4 TABLET SUBLINGUAL
Status: DISCONTINUED | OUTPATIENT
Start: 2024-08-01 | End: 2024-08-06 | Stop reason: HOSPADM

## 2024-08-01 RX ORDER — ATORVASTATIN CALCIUM 40 MG/1
40 TABLET, FILM COATED ORAL NIGHTLY
COMMUNITY

## 2024-08-01 RX ORDER — TRAMADOL HYDROCHLORIDE 50 MG/1
50 TABLET ORAL NIGHTLY
Status: DISCONTINUED | OUTPATIENT
Start: 2024-08-01 | End: 2024-08-06 | Stop reason: HOSPADM

## 2024-08-01 RX ORDER — BISACODYL 10 MG
10 SUPPOSITORY, RECTAL RECTAL DAILY PRN
Status: DISCONTINUED | OUTPATIENT
Start: 2024-08-01 | End: 2024-08-06 | Stop reason: HOSPADM

## 2024-08-01 RX ORDER — PANTOPRAZOLE SODIUM 40 MG/1
40 TABLET, DELAYED RELEASE ORAL
Status: DISCONTINUED | OUTPATIENT
Start: 2024-08-01 | End: 2024-08-06 | Stop reason: HOSPADM

## 2024-08-01 RX ORDER — ASPIRIN 81 MG/1
81 TABLET, CHEWABLE ORAL DAILY
COMMUNITY

## 2024-08-01 RX ORDER — AMOXICILLIN 250 MG
2 CAPSULE ORAL 2 TIMES DAILY PRN
Status: DISCONTINUED | OUTPATIENT
Start: 2024-08-01 | End: 2024-08-06 | Stop reason: HOSPADM

## 2024-08-01 RX ORDER — FERROUS SULFATE 220 (44)/5
220 ELIXIR ORAL DAILY
COMMUNITY

## 2024-08-01 RX ORDER — POTASSIUM CHLORIDE 20 MEQ/1
20 TABLET, EXTENDED RELEASE ORAL DAILY
COMMUNITY

## 2024-08-01 RX ORDER — MULTIPLE VITAMINS W/ MINERALS TAB 9MG-400MCG
1 TAB ORAL DAILY
Status: DISCONTINUED | OUTPATIENT
Start: 2024-08-01 | End: 2024-08-06 | Stop reason: HOSPADM

## 2024-08-01 RX ORDER — FUROSEMIDE 10 MG/ML
40 INJECTION INTRAMUSCULAR; INTRAVENOUS ONCE
Status: COMPLETED | OUTPATIENT
Start: 2024-08-01 | End: 2024-08-01

## 2024-08-01 RX ORDER — ALBUTEROL SULFATE 2.5 MG/3ML
2.5 SOLUTION RESPIRATORY (INHALATION) EVERY 4 HOURS PRN
Status: DISCONTINUED | OUTPATIENT
Start: 2024-08-01 | End: 2024-08-06 | Stop reason: HOSPADM

## 2024-08-01 RX ORDER — ALBUTEROL SULFATE 90 UG/1
2 AEROSOL, METERED RESPIRATORY (INHALATION) EVERY 4 HOURS PRN
COMMUNITY

## 2024-08-01 RX ORDER — FUROSEMIDE 10 MG/ML
20 INJECTION INTRAMUSCULAR; INTRAVENOUS EVERY 6 HOURS
Status: COMPLETED | OUTPATIENT
Start: 2024-08-01 | End: 2024-08-02

## 2024-08-01 RX ORDER — TRAMADOL HYDROCHLORIDE 50 MG/1
50 TABLET ORAL NIGHTLY
COMMUNITY

## 2024-08-01 RX ORDER — BISACODYL 5 MG/1
5 TABLET, DELAYED RELEASE ORAL DAILY PRN
Status: DISCONTINUED | OUTPATIENT
Start: 2024-08-01 | End: 2024-08-06 | Stop reason: HOSPADM

## 2024-08-01 RX ORDER — POLYETHYLENE GLYCOL 3350 17 G/17G
17 POWDER, FOR SOLUTION ORAL DAILY
COMMUNITY

## 2024-08-01 RX ORDER — ATORVASTATIN CALCIUM 40 MG/1
40 TABLET, FILM COATED ORAL NIGHTLY
Status: DISCONTINUED | OUTPATIENT
Start: 2024-08-01 | End: 2024-08-06 | Stop reason: HOSPADM

## 2024-08-01 RX ORDER — SODIUM CHLORIDE 0.9 % (FLUSH) 0.9 %
10 SYRINGE (ML) INJECTION EVERY 12 HOURS SCHEDULED
Status: DISCONTINUED | OUTPATIENT
Start: 2024-08-01 | End: 2024-08-06 | Stop reason: HOSPADM

## 2024-08-01 RX ORDER — OMEPRAZOLE 20 MG/1
20 CAPSULE, DELAYED RELEASE ORAL DAILY
COMMUNITY

## 2024-08-01 RX ORDER — ACETAMINOPHEN 325 MG/1
650 TABLET ORAL EVERY 4 HOURS PRN
Status: DISCONTINUED | OUTPATIENT
Start: 2024-08-01 | End: 2024-08-06 | Stop reason: HOSPADM

## 2024-08-01 RX ORDER — SODIUM CHLORIDE 0.9 % (FLUSH) 0.9 %
10 SYRINGE (ML) INJECTION AS NEEDED
Status: DISCONTINUED | OUTPATIENT
Start: 2024-08-01 | End: 2024-08-06 | Stop reason: HOSPADM

## 2024-08-01 RX ORDER — ONDANSETRON 4 MG/1
4 TABLET, FILM COATED ORAL EVERY 4 HOURS PRN
COMMUNITY

## 2024-08-01 RX ORDER — SERTRALINE HYDROCHLORIDE 25 MG/1
25 TABLET, FILM COATED ORAL DAILY
COMMUNITY

## 2024-08-01 RX ORDER — LACTULOSE 10 G/15ML
20 SOLUTION ORAL 2 TIMES DAILY PRN
COMMUNITY

## 2024-08-01 RX ORDER — MULTIPLE VITAMINS W/ MINERALS TAB 9MG-400MCG
1 TAB ORAL DAILY
COMMUNITY

## 2024-08-01 RX ORDER — CARVEDILOL 6.25 MG/1
6.25 TABLET ORAL 2 TIMES DAILY WITH MEALS
Status: DISCONTINUED | OUTPATIENT
Start: 2024-08-01 | End: 2024-08-01

## 2024-08-01 RX ORDER — FUROSEMIDE 20 MG/1
20 TABLET ORAL 2 TIMES DAILY
COMMUNITY
End: 2024-08-06 | Stop reason: HOSPADM

## 2024-08-01 RX ORDER — ACETAMINOPHEN 160 MG/5ML
650 SOLUTION ORAL EVERY 4 HOURS PRN
Status: DISCONTINUED | OUTPATIENT
Start: 2024-08-01 | End: 2024-08-06 | Stop reason: HOSPADM

## 2024-08-01 RX ORDER — CARVEDILOL 3.12 MG/1
3.12 TABLET ORAL 2 TIMES DAILY WITH MEALS
Status: DISCONTINUED | OUTPATIENT
Start: 2024-08-02 | End: 2024-08-01

## 2024-08-01 RX ORDER — ASPIRIN 81 MG/1
81 TABLET, CHEWABLE ORAL DAILY
Status: DISCONTINUED | OUTPATIENT
Start: 2024-08-02 | End: 2024-08-06 | Stop reason: HOSPADM

## 2024-08-01 RX ORDER — CARVEDILOL 6.25 MG/1
6.25 TABLET ORAL 2 TIMES DAILY WITH MEALS
COMMUNITY

## 2024-08-01 RX ORDER — SERTRALINE HYDROCHLORIDE 25 MG/1
25 TABLET, FILM COATED ORAL DAILY
Status: DISCONTINUED | OUTPATIENT
Start: 2024-08-01 | End: 2024-08-06 | Stop reason: HOSPADM

## 2024-08-01 RX ORDER — ACETAMINOPHEN 650 MG/1
650 SUPPOSITORY RECTAL EVERY 4 HOURS PRN
Status: DISCONTINUED | OUTPATIENT
Start: 2024-08-01 | End: 2024-08-06 | Stop reason: HOSPADM

## 2024-08-01 RX ORDER — ASPIRIN 81 MG/1
324 TABLET, CHEWABLE ORAL ONCE
Status: COMPLETED | OUTPATIENT
Start: 2024-08-01 | End: 2024-08-01

## 2024-08-01 RX ADMIN — MIDODRINE HYDROCHLORIDE 5 MG: 5 TABLET ORAL at 17:14

## 2024-08-01 RX ADMIN — FUROSEMIDE 40 MG: 10 INJECTION, SOLUTION INTRAMUSCULAR; INTRAVENOUS at 20:54

## 2024-08-01 RX ADMIN — FUROSEMIDE 40 MG: 10 INJECTION, SOLUTION INTRAMUSCULAR; INTRAVENOUS at 03:06

## 2024-08-01 RX ADMIN — PANTOPRAZOLE SODIUM 40 MG: 40 TABLET, DELAYED RELEASE ORAL at 05:42

## 2024-08-01 RX ADMIN — FUROSEMIDE 20 MG: 10 INJECTION, SOLUTION INTRAMUSCULAR; INTRAVENOUS at 08:34

## 2024-08-01 RX ADMIN — Medication 1 TABLET: at 08:33

## 2024-08-01 RX ADMIN — Medication 10 ML: at 08:34

## 2024-08-01 RX ADMIN — SERTRALINE HYDROCHLORIDE 25 MG: 25 TABLET ORAL at 08:33

## 2024-08-01 RX ADMIN — ATORVASTATIN CALCIUM 40 MG: 40 TABLET, FILM COATED ORAL at 20:54

## 2024-08-01 RX ADMIN — TRAMADOL HYDROCHLORIDE 50 MG: 50 TABLET ORAL at 20:54

## 2024-08-01 RX ADMIN — FUROSEMIDE 20 MG: 10 INJECTION, SOLUTION INTRAMUSCULAR; INTRAVENOUS at 17:13

## 2024-08-01 RX ADMIN — FUROSEMIDE 20 MG: 10 INJECTION, SOLUTION INTRAMUSCULAR; INTRAVENOUS at 20:55

## 2024-08-01 RX ADMIN — Medication 10 ML: at 20:57

## 2024-08-01 RX ADMIN — ASPIRIN 81 MG CHEWABLE TABLET 324 MG: 81 TABLET CHEWABLE at 03:06

## 2024-08-01 NOTE — PLAN OF CARE
Patient stable throughout shift - hematuria has improved as the day has went on. Patient remained A&Ox4 throughout shift. All VSS.         Phase 1 - Admission/Acute - Current (Heart Failure Pathway)  Patient's oxygen saturation maintained above 90% unless otherwise specified.  Outcome: Met  Patient reports improvement in symptoms since arrival.  Outcome: Met  An increase-progression in activity with patient's baseline in mind. Patient performing daily AMPAC-6 goal.  Outcome: Met   Goal Outcome Evaluation:

## 2024-08-01 NOTE — ED PROVIDER NOTES
Subjective   History of Present Illness  84-year-old male transferred from assisted living for altered mental status with hypotension and bradycardia.  Patient states he felt well today and had sat down in his chair to go to sleep and woke up feeling confused and fatigued.  Patient tells me he could not do anything and could not think clearly.  Patient states he felt numb all over but denies any focal numbness or weakness.  Patient denies any associated fever or chest symptoms or any other symptoms otherwise.  Patient states now he feels much better.  Patient reportedly had a blood pressure of 70/40 per EMS his initial assessment and was given normal saline 500 cc upon my arrival with normalization of blood pressure.  Patient was also bradycardic in the 40s and again that is normal now as well.      Review of Systems   Constitutional:  Positive for fatigue.   Neurological:  Positive for numbness.   Psychiatric/Behavioral:  Positive for confusion.        Past Medical History:   Diagnosis Date    SRIKANTH (acute kidney injury)     Aortic valve stenosis     Cervical spinal stenosis     CHF (congestive heart failure)     Coronary artery disease     DDD (degenerative disc disease), cervical     Elevated cholesterol     Hyperlipidemia     Hypertension     Pneumonia        Allergies   Allergen Reactions    Codeine Rash    Latex Rash     Other reaction(s): ; 5/3/2006 11:36:20 AM    Sulfa Antibiotics Rash       Past Surgical History:   Procedure Laterality Date    CORONARY ARTERY BYPASS GRAFT         History reviewed. No pertinent family history.    Social History     Socioeconomic History    Marital status:    Tobacco Use    Smoking status: Never   Vaping Use    Vaping status: Never Used   Substance and Sexual Activity    Alcohol use: Never    Drug use: Not Currently           Objective   Physical Exam  Constitutional:       Appearance: Normal appearance.   HENT:      Head: Normocephalic and atraumatic.      Mouth/Throat:       Mouth: Mucous membranes are moist.      Pharynx: Oropharynx is clear.   Eyes:      Extraocular Movements: Extraocular movements intact.      Conjunctiva/sclera: Conjunctivae normal.      Pupils: Pupils are equal, round, and reactive to light.   Cardiovascular:      Rate and Rhythm: Normal rate and regular rhythm.      Heart sounds: Murmur heard.   Pulmonary:      Effort: Pulmonary effort is normal.      Comments: Mild bibasilar crackles, no distress  Abdominal:      General: Bowel sounds are normal.      Palpations: Abdomen is soft.   Musculoskeletal:         General: Normal range of motion.   Skin:     General: Skin is warm and dry.      Capillary Refill: Capillary refill takes less than 2 seconds.   Neurological:      Mental Status: He is alert and oriented to person, place, and time.      Cranial Nerves: No cranial nerve deficit.      Sensory: No sensory deficit.      Motor: No weakness.   Psychiatric:         Mood and Affect: Mood normal.         Behavior: Behavior normal.         Procedures           ED Course                                             Medical Decision Making  84-year-old male who has been normotensive in the emergency department.  It was noted patient was off of his oxygen for a unknown amount of time at assisted living.  POA at bedside who is the patient's adoptive daughter offers concerned that we will admit the patient and have him needlessly in the hospital for days resulting in deconditioning.  I did my best to explain to this individual that the patient has evidence of decompensation of his CHF as well as an elevated troponin though the patient has no chest pain or shortness of air.  She is agreeable to overnight observation and more importantly the patient is as well as he is lucid and appropriate.  Will gently diurese and observe in the hospital.    Case discussed with Kathryn with hospitalist service, agrees with admit    Problems Addressed:  Acute on chronic congestive heart  failure, unspecified heart failure type: complicated acute illness or injury  Altered mental status, unspecified altered mental status type: complicated acute illness or injury    Amount and/or Complexity of Data Reviewed  Labs: ordered.  Radiology: ordered.  ECG/medicine tests: ordered and independent interpretation performed.     Details: EKG interpretation: Normal sinus rhythm, rate 73, no STEMI    Risk  OTC drugs.  Prescription drug management.  Decision regarding hospitalization.        Final diagnoses:   Altered mental status, unspecified altered mental status type   Acute on chronic congestive heart failure, unspecified heart failure type       ED Disposition  ED Disposition       ED Disposition   Decision to Admit    Condition   --    Comment   Level of Care: Telemetry [5]   Diagnosis: CHF exacerbation [594358]                 No follow-up provider specified.       Medication List      No changes were made to your prescriptions during this visit.            Michael Lozano MD  08/01/24 0600

## 2024-08-01 NOTE — CONSULTS
Cardiology consult note  Naveed Morin MD, PhD  8-1-2024        Referring Provider: Brad Alicea MD    Reason for Consultation:      Heart failure  Elevated troponin  Mental status changes      Patient Care Team:  Emily Barahona DO as PCP - General (Family Medicine)    Seen and examined agree with narrative as discussed with nurse practitioner after face-to-face encounter scruff findings below greater than 50% of total encounter time and medical decision making performed by me      SUBJECTIVE     Chief Complaint: Mental status changes    History of present illness:  Andrea Luna is a 84 y.o. male with a history of coronary artery disease who presented to [facility] with complaint of mental status changes from his facility.    Patient is a poor historian and really unable to provide me any details about his medical history.  Information is obtained from reviewing his chart.  There is no family present.    Patient lives in a long-term care facility.  It is reported that he had altered mental status, hypotension and bradycardia and was transferred to the emergency room for further evaluation.    In the emergency room the patient's blood pressure was reported to be 70/40.  He was given IV normal saline.  Heart rate was reported to be in the 40s.    Evaluation in the emergency room includes high-sensitivity troponin times 2/1/2010, 137 proBNP 21,470.  BUN and creatinine 22 and 1.2.  Potassium 3.7 lactic was 1.6 white blood cell count 8.9 hemoglobin 11.4 hematocrit 37.1 platelets 129 blood cultures are pending.  COVID 19 swab was negative.  CT of his head showed no acute intracranial abnormalities.  Chest x-ray showed moderate pulmonary edema with small bilateral pleural effusions.  EKG on admission showed sinus rhythm with a heart rate of 73.  LVH.  No acute ST or T wave segment abnormalities    Patient is an 84-year-old gentleman who routinely follows with cardiology.  He is seen by [facility]  heart specialist typically Dr. Hollingsworth.    Review of his medical records show that the patient has a history of coronary artery disease with remote CABG in 2003.  Patient is known to have hypertension, dyslipidemia, aortic stenosis.  Last echocardiogram was reported to have EF of 45 to 50% with moderate aortic stenosis.    2D echo today reviewed and interpreted by me demonstrates an EF of around 30% with regional abnormalities with akinesis of the posterior wall inferolateral wall and high lateral wall, mild LV dilation, likely low gradient severe aortic valve stenosis with moderate to severe eccentric jet of mitral valve vegetation and dilated mitral valve annulus with tethered leaflets given LV enlargement.  RV appears mildly hypokinetic globally, no mass or effusion seen, elevated pulmonary pressures with mild pulm hypertension, IVC is dilated consistent with volume overload right atrial pressure estimated 15-20    Patient has had post CABG PCI to the RCA.    Last catheterization is reported to have 100% native LAD occlusion, 100% native left circumflex occlusion, 30% RCA stenosis.  LIMA to the LAD was patent: saphenous vein graft to the diagonal is patent, saphenous vein graft to the OM was patent.  EF by LV gram was 40 to 45%    Historical data copied forward from previous encounters in EMR is unchanged    Review of systems otherwise negative x 14 point review of systems except as mentioned above  Hard of hearing  Limited history obtained, patient saying he cannot remember some of his medical history but he knows Dr. Hollingsworth takes care of him           Personal History:      Past Medical History:   Diagnosis Date    SRIKANTH (acute kidney injury)     Aortic valve stenosis     Cervical spinal stenosis     CHF (congestive heart failure)     Coronary artery disease     DDD (degenerative disc disease), cervical     Elevated cholesterol     Hyperlipidemia     Hypertension     Pneumonia        Past Surgical History:    Procedure Laterality Date    CORONARY ARTERY BYPASS GRAFT         History reviewed. No pertinent family history.    Social History     Tobacco Use    Smoking status: Never   Vaping Use    Vaping status: Never Used   Substance Use Topics    Alcohol use: Never    Drug use: Not Currently        Home meds:  Prior to Admission medications    Medication Sig Start Date End Date Taking? Authorizing Provider   albuterol sulfate  (90 Base) MCG/ACT inhaler Inhale 2 puffs Every 4 (Four) Hours As Needed for Wheezing.    Abbe May MD   aspirin 81 MG chewable tablet Chew 1 tablet Daily.    Abbe May MD   atorvastatin (LIPITOR) 40 MG tablet Take 1 tablet by mouth Every Night.    Abbe May MD   carvedilol (COREG) 6.25 MG tablet Take 1 tablet by mouth 2 (Two) Times a Day With Meals.    Abbe May MD   docusate sodium (COLACE) 100 MG capsule Take 1 capsule by mouth Daily.    Abbe May MD   Ferrous Sulfate 220 (44 Fe) MG/5ML oral solution Take 5 mL by mouth Daily.    Abbe May MD   furosemide (LASIX) 20 MG tablet Take 1 tablet by mouth 2 (Two) Times a Day.    Abbe May MD   lactulose (CHRONULAC) 10 GM/15ML solution Take 30 mL by mouth 2 (Two) Times a Day As Needed.    Abbe May MD   multivitamin with minerals tablet tablet Take 1 tablet by mouth Daily.    Abbe May MD   omeprazole (priLOSEC) 20 MG capsule Take 1 capsule by mouth Daily.    Abbe May MD   ondansetron (ZOFRAN) 4 MG tablet Take 1 tablet by mouth Every 4 (Four) Hours As Needed for Nausea or Vomiting.    Abbe May MD   polyethylene glycol (MiraLax) 17 g packet Take 17 g by mouth Daily.    Abbe May MD   potassium chloride (KLOR-CON M20) 20 MEQ CR tablet Take 1 tablet by mouth Daily.    Abbe May MD   sertraline (ZOLOFT) 25 MG tablet Take 1 tablet by mouth Daily.    Abbe May MD   traMADol (ULTRAM) 50  "MG tablet Take 1 tablet by mouth Every Night.    Provider, MD Abbe       Allergies:     Codeine, Latex, and Sulfa antibiotics    Scheduled Meds:[START ON 8/2/2024] aspirin, 81 mg, Oral, Daily  atorvastatin, 40 mg, Oral, Nightly  carvedilol, 6.25 mg, Oral, BID With Meals  furosemide, 20 mg, Intravenous, Q6H  multivitamin with minerals, 1 tablet, Oral, Daily  pantoprazole, 40 mg, Oral, Q AM  sertraline, 25 mg, Oral, Daily  sodium chloride, 10 mL, Intravenous, Q12H  traMADol, 50 mg, Oral, Nightly      Continuous Infusions:   PRN Meds:  acetaminophen **OR** acetaminophen **OR** acetaminophen    albuterol    senna-docusate sodium **AND** polyethylene glycol **AND** bisacodyl **AND** bisacodyl    melatonin    nitroglycerin    ondansetron    sodium chloride    sodium chloride    sodium chloride      OBJECTIVE    Vital Signs  Vitals:    08/01/24 0316 08/01/24 0416 08/01/24 0447 08/01/24 0700   BP: 133/72 103/67  120/61   BP Location:    Right arm   Patient Position:    Lying   Pulse: 69 66  61   Resp:    12   Temp:    96.4 °F (35.8 °C)   TempSrc:    Oral   SpO2: 100% 97%  98%   Weight:   57.7 kg (127 lb 3.3 oz)    Height:           Flowsheet Rows      Flowsheet Row First Filed Value   Admission Height 170.2 cm (67\") Documented at 07/31/2024 2330   Admission Weight 57.7 kg (127 lb 3.3 oz) Documented at 08/01/2024 0447              Intake/Output Summary (Last 24 hours) at 8/1/2024 0915  Last data filed at 8/1/2024 0500  Gross per 24 hour   Intake 260 ml   Output --   Net 260 ml        Telemetry: Sinus rhythm    Physical Exam:  The patient is alert, oriented and in no distress.  Frail in appearance, nasal cannula  Vital signs as noted above.  Head and neck revealed no carotid bruits, positive JVD HJR  Lungs clear.  Diminished in the bases  Heart: Normal first and second heart sounds.  Systolic murmur present.   Abdomen: Soft and nontender.  No organomegaly is present.  Extremities with good peripheral pulses, positive " lower extremity edema especially posteriorly with significant pitting 1-2+  Skin: Warm and dry.  Musculoskeletal system is grossly normal.  CNS grossly normal.  No gross deficits      Results Review:  I have personally reviewed the results from the time of this admission to 8/1/2024 09:15 EDT and agree with these findings:  []  Laboratory  []  Microbiology  []  Radiology  []  EKG/Telemetry   []  Cardiology/Vascular   []  Pathology  []  Old records  []  Other:    Most notable findings include:     Lab Results (last 24 hours)       Procedure Component Value Units Date/Time    Basic Metabolic Panel [[id number]]  (Abnormal) Collected: 08/01/24 0417    Specimen: Blood Updated: 08/01/24 0453     Glucose 85 mg/dL      BUN 22 mg/dL      Creatinine 1.26 mg/dL      Sodium 141 mmol/L      Potassium 3.7 mmol/L      Comment: Specimen hemolyzed.  Result may be falsely elevated.        Chloride 106 mmol/L      CO2 25.5 mmol/L      Calcium 8.1 mg/dL      BUN/Creatinine Ratio 17.5     Anion Gap 9.5 mmol/L      eGFR 55.9 mL/min/1.73     Narrative:      GFR Normal >60  Chronic Kidney Disease <60  Kidney Failure <15    The GFR formula is only valid for adults with stable renal function between ages 18 and 70.    Urinalysis, Microscopic Only - Straight Cath [[id number]]  (Abnormal) Collected: 08/01/24 0207    Specimen: Urine from Straight Cath Updated: 08/01/24 0224     RBC, UA Too Numerous to Count /HPF      WBC, UA 3-5 /HPF      Comment: Urine culture not indicated.        Bacteria, UA None Seen /HPF      Squamous Epithelial Cells, UA 3-6 /HPF      Hyaline Casts, UA 3-6 /LPF      Methodology Manual Light Microscopy    High Sensitivity Troponin T 2Hr [[id number]]  (Abnormal) Collected: 08/01/24 0147    Specimen: Blood Updated: 08/01/24 0221     HS Troponin T 137 ng/L      Troponin T Delta 27 ng/L     Narrative:      High Sensitive Troponin T Reference Range:  <14.0 ng/L- Negative Female for AMI  <22.0 ng/L- Negative Male for AMI  >=14 -  Abnormal Female indicating possible myocardial injury.  >=22 - Abnormal Male indicating possible myocardial injury.   Clinicians would have to utilize clinical acumen, EKG, Troponin, and serial changes to determine if it is an Acute Myocardial Infarction or myocardial injury due to an underlying chronic condition.         Urinalysis With Culture If Indicated - Straight Cath [[id number]]  (Abnormal) Collected: 08/01/24 0207    Specimen: Urine from Straight Cath Updated: 08/01/24 0215     Color, UA Dark Yellow     Appearance, UA Clear     pH, UA 5.5     Specific Gravity, UA 1.013     Glucose, UA Negative     Ketones, UA Trace     Bilirubin, UA Negative     Blood, UA Large (3+)     Protein,  mg/dL (2+)     Leuk Esterase, UA Trace     Nitrite, UA Negative     Urobilinogen, UA 1.0 E.U./dL    Narrative:      In absence of clinical symptoms, the presence of pyuria, bacteria, and/or nitrites on the urinalysis result does not correlate with infection.    Blood Culture - Blood, Arm, Left [[id number]] Collected: 08/01/24 0147    Specimen: Blood from Arm, Left Updated: 08/01/24 0155    Comprehensive Metabolic Panel [[id number]]  (Abnormal) Collected: 08/01/24 0014    Specimen: Blood Updated: 08/01/24 0053     Glucose 86 mg/dL      BUN 22 mg/dL      Creatinine 1.41 mg/dL      Sodium 139 mmol/L      Potassium 4.2 mmol/L      Comment: Slight hemolysis detected by analyzer. Result may be falsely elevated.        Chloride 104 mmol/L      CO2 24.4 mmol/L      Calcium 9.1 mg/dL      Total Protein 6.1 g/dL      Albumin 3.5 g/dL      ALT (SGPT) 10 U/L      AST (SGOT) 32 U/L      Comment: Slight hemolysis detected by analyzer. Result may be falsely elevated.        Alkaline Phosphatase 149 U/L      Total Bilirubin 0.7 mg/dL      Globulin 2.6 gm/dL      A/G Ratio 1.3 g/dL      BUN/Creatinine Ratio 15.6     Anion Gap 10.6 mmol/L      eGFR 48.8 mL/min/1.73     Narrative:      GFR Normal >60  Chronic Kidney Disease <60  Kidney Failure  <15    The GFR formula is only valid for adults with stable renal function between ages 18 and 70.    Single High Sensitivity Troponin T [[id number]]  (Abnormal) Collected: 08/01/24 0014    Specimen: Blood Updated: 08/01/24 0053     HS Troponin T 110 ng/L     Narrative:      High Sensitive Troponin T Reference Range:  <14.0 ng/L- Negative Female for AMI  <22.0 ng/L- Negative Male for AMI  >=14 - Abnormal Female indicating possible myocardial injury.  >=22 - Abnormal Male indicating possible myocardial injury.   Clinicians would have to utilize clinical acumen, EKG, Troponin, and serial changes to determine if it is an Acute Myocardial Infarction or myocardial injury due to an underlying chronic condition.         CBC & Differential [[id number]]  (Abnormal) Collected: 08/01/24 0014    Specimen: Blood Updated: 08/01/24 0050    Narrative:      The following orders were created for panel order CBC & Differential.  Procedure                               Abnormality         Status                     ---------                               -----------         ------                     CBC Auto Differential[[id number]]        Abnormal            Final result               Scan Slide[[id number]]                                       Final result                 Please view results for these tests on the individual orders.    CBC Auto Differential [[id number]]  (Abnormal) Collected: 08/01/24 0014    Specimen: Blood Updated: 08/01/24 0050     WBC 8.95 10*3/mm3      RBC 3.85 10*6/mm3      Hemoglobin 11.4 g/dL      Hematocrit 37.1 %      MCV 96.4 fL      MCH 29.6 pg      MCHC 30.7 g/dL      RDW 15.7 %      RDW-SD 55.4 fl      MPV 10.0 fL      Platelets 129 10*3/mm3      Neutrophil % 87.3 %      Lymphocyte % 6.9 %      Monocyte % 4.9 %      Eosinophil % 0.3 %      Basophil % 0.2 %      Immature Grans % 0.4 %      Neutrophils, Absolute 7.80 10*3/mm3      Lymphocytes, Absolute 0.62 10*3/mm3      Monocytes, Absolute 0.44 10*3/mm3       Eosinophils, Absolute 0.03 10*3/mm3      Basophils, Absolute 0.02 10*3/mm3      Immature Grans, Absolute 0.04 10*3/mm3      nRBC 0.0 /100 WBC     Scan Slide [[id number]] Collected: 08/01/24 0014    Specimen: Blood Updated: 08/01/24 0050     Acanthocytes Slight/1+     Crenated RBC's Slight/1+     Poikilocytes Slight/1+     WBC Morphology Normal     Platelet Estimate Decreased    BNP [[id number]]  (Abnormal) Collected: 08/01/24 0014    Specimen: Blood Updated: 08/01/24 0048     proBNP 21,470.0 pg/mL     Narrative:      This assay is used as an aid in the diagnosis of individuals suspected of having heart failure. It can be used as an aid in the diagnosis of acute decompensated heart failure (ADHF) in patients presenting with signs and symptoms of ADHF to the emergency department (ED). In addition, NT-proBNP of <300 pg/mL indicates ADHF is not likely.    Age Range Result Interpretation  NT-proBNP Concentration (pg/mL:      <50             Positive            >450                   Gray                 300-450                    Negative             <300    50-75           Positive            >900                  Gray                300-900                  Negative            <300      >75             Positive            >1800                  Gray                300-1800                  Negative            <300    COVID-19,CEPHEID/DAVID,COR/KAYLYN/PAD/EDEN/LAG/SAFIA IN-HOUSE,NP SWAB IN TRANSPORT MEDIA 1 HR TAT, RT-PCR - Swab, Nasopharynx [[id number]]  (Normal) Collected: 08/01/24 0014    Specimen: Swab from Nasopharynx Updated: 08/01/24 0043     COVID19 Not Detected    Narrative:      Fact sheet for providers: https://www.fda.gov/media/961183/download     Fact sheet for patients: https://www.fda.gov/media/125860/download  Fact sheet for providers: https://www.fda.gov/media/471964/download    Fact sheet for patients: https://www.fda.gov/media/576465/download    Test performed by PCR.    [place] Draw [[id number]] Collected:  08/01/24 0014    Specimen: Blood Updated: 08/01/24 0031    Narrative:      The following orders were created for panel order [place] Draw.  Procedure                               Abnormality         Status                     ---------                               -----------         ------                     Green Top (Gel)[[id number]]                                  Final result               Lavender Top[[id number]]                                     Final result               Gold Top - SST[[id number]]                                   Final result               Light Blue Top[[id number]]                                   Final result                 Please view results for these tests on the individual orders.    Green Top (Gel) [[id number]] Collected: 08/01/24 0014    Specimen: Blood Updated: 08/01/24 0031     Extra Tube Hold for add-ons.     Comment: Auto resulted.       Lavender Top [[id number]] Collected: 08/01/24 0014    Specimen: Blood Updated: 08/01/24 0031     Extra Tube hold for add-on     Comment: Auto resulted       Gold Top - SST [[id number]] Collected: 08/01/24 0014    Specimen: Blood Updated: 08/01/24 0031     Extra Tube Hold for add-ons.     Comment: Auto resulted.       Light Blue Top [[id number]] Collected: 08/01/24 0014    Specimen: Blood Updated: 08/01/24 0031     Extra Tube Hold for add-ons.     Comment: Auto resulted       POC Lactate [[id number]]  (Normal) Collected: 08/01/24 0020    Specimen: Blood Updated: 08/01/24 0022     Lactate 1.6 mmol/L      Comment: Serial Number: [id number]:  [id number]       Blood Gas, Arterial - [[id number]]  (Abnormal) Collected: 08/01/24 0014    Specimen: Arterial Blood Updated: 08/01/24 0021     Site Left Radial     Ash's Test Positive     pH, Arterial 7.339 pH units      pCO2, Arterial 48.9 mm Hg      pO2, Arterial 103.4 mm Hg      HCO3, Arterial 26.4 mmol/L      Base Excess, Arterial 0.2 mmol/L      Comment: Serial Number: [id number]:  [id number]         O2 Saturation, Arterial 97.5 %      CO2 Content 27.9 mmol/L      Barometric Pressure for Blood Gas --     Comment: N/A        Modality Cannula     FIO2 36 %      Hemodilution No     PO2/FIO2 287    Blood Culture - Blood, Arm, Right [[id number]] Collected: 08/01/24 0014    Specimen: Blood from Arm, Right Updated: 08/01/24 0021            Imaging Results (Last 24 Hours)       Procedure Component Value Units Date/Time    CT Head Without Contrast [[id number]] Collected: 08/01/24 0120     Updated: 08/01/24 0124    Narrative:      CT HEAD WO CONTRAST    Date of Exam: 8/1/2024 1:00 AM EDT    Indication: ams.    Comparison: 11/27/2023.    Technique: Axial CT images were obtained of the head without contrast administration.  Coronal reconstructions were performed.  Automated exposure control and iterative reconstruction methods were used.      Findings:  Superficial soft tissues appear within normal limits. Mild left ethmoid sinus and left maxillary sinus mucosal disease status post antrostomy. Mastoids are clear. Paranasal sinuses and mastoid air cells appear well aerated.  Orbits are unremarkable.    There is no acute intracranial hemorrhage.  No mass effect or midline shift.  No abnormal extra-axial collections.  Gray-white differentiation is within normal limits. Stable patchy white matter hypoattenuation; mild to moderate in magnitude. There is   generalized parenchymal volume loss congruent with age. There are small chronic lacunar infarctions in the basal ganglia. Ventricular size and configuration is normal for age.      Impression:      Impression:  1.No acute intracranial abnormality.  2.Stable mild to moderate chronic small vessel ischemic change and generalized parenchymal volume loss.  3.Mild paranasal sinus mucosal disease status post antrostomy.        Electronically Signed: Sadiq Reza MD    8/1/2024 1:22 AM EDT    Workstation ID: [id number]    XR Chest 1 View [[id number]] Collected: 08/01/24 0057     Updated:  08/01/24 0100    Narrative:      XR CHEST 1 VW    Date of Exam: 8/1/2024 12:20 AM EDT    Indication: ams    Comparison: 6/27/2024    Findings:  There is worsened moderate pulmonary edema. There are stable small small bilateral pleural effusions. There is evidence of prior median sternotomy and CABG. No pneumothorax. Pulmonary vasculature is indistinct. Cardiac silhouette is partially obscured,   but likely unchanged from the prior study. No acute osseous abnormality.      Impression:      Impression:  Worsening moderate pulmonary edema with stable small bilateral pleural effusions.        Electronically Signed: Sadiq Reza MD    8/1/2024 12:58 AM EDT    Workstation ID: [id number]            LAB RESULTS (LAST 7 DAYS)    CBC  Results from last 7 days   Lab Units 08/01/24  0014   WBC 10*3/mm3 8.95   RBC 10*6/mm3 3.85*   HEMOGLOBIN g/dL 11.4*   HEMATOCRIT % 37.1*   MCV fL 96.4   PLATELETS 10*3/mm3 129*       BMP  Results from last 7 days   Lab Units 08/01/24  0417 08/01/24  0014   SODIUM mmol/L 141 139   POTASSIUM mmol/L 3.7 4.2   CHLORIDE mmol/L 106 104   CO2 mmol/L 25.5 24.4   BUN mg/dL 22 22   CREATININE mg/dL 1.26 1.41*   GLUCOSE mg/dL 85 86       CMP   Results from last 7 days   Lab Units 08/01/24  0417 08/01/24  0014   SODIUM mmol/L 141 139   POTASSIUM mmol/L 3.7 4.2   CHLORIDE mmol/L 106 104   CO2 mmol/L 25.5 24.4   BUN mg/dL 22 22   CREATININE mg/dL 1.26 1.41*   GLUCOSE mg/dL 85 86   ALBUMIN g/dL  --  3.5   BILIRUBIN mg/dL  --  0.7   ALK PHOS U/L  --  149*   AST (SGOT) U/L  --  32   ALT (SGPT) U/L  --  10       BNP        TROPONIN  Results from last 7 days   Lab Units 08/01/24  0147   HSTROP T ng/L 137*       CoAg        Creatinine Clearance  Estimated Creatinine Clearance: 35 mL/min (by C-G formula based on SCr of 1.26 mg/dL).    ABG  Results from last 7 days   Lab Units 08/01/24  0014   PH, ARTERIAL pH units 7.339*   PCO2, ARTERIAL mm Hg 48.9*   PO2 ART mm Hg 103.4   O2 SATURATION ART % 97.5   BASE EXCESS ART  mmol/L 0.2         Radiology  CT Head Without Contrast    Result Date: 8/1/2024  Impression: 1.No acute intracranial abnormality. 2.Stable mild to moderate chronic small vessel ischemic change and generalized parenchymal volume loss. 3.Mild paranasal sinus mucosal disease status post antrostomy. Electronically Signed: Sadiq Reza MD  8/1/2024 1:22 AM EDT  Workstation ID: [id number]    XR Chest 1 View    Result Date: 8/1/2024  Impression: Worsening moderate pulmonary edema with stable small bilateral pleural effusions. Electronically Signed: Sadiq Reaz MD  8/1/2024 12:58 AM EDT  Workstation ID: [id number]       EKG  I personally viewed and interpreted the patient's EKG/Telemetry data:  ECG 12 Lead Other; Sepsis   Final Result   HEART RATE=73  bpm   RR Gwxyhjqw=617  ms   ME Kwsxvoen=236  ms   P Horizontal Axis=  deg   P Front Axis=91  deg   QRSD Ixtlhfvt=153  ms   QT Mrbrdkzk=658  ms   XUvQ=371  ms   QRS Axis=82  deg   T Wave Axis=131  deg   - ABNORMAL ECG -   Sinus rhythm   LVH with secondary repolarization abnormality   When compared with ECG of 25-Feb-2020 13:18:04,   Significant repolarization change   Significant axis, voltage or hypertrophy change   Electronically Signed By: Michael Lozano (Dariusz) 2024-08-01 06:23:55   Date and Time of Study:2024-08-01 00:07:06      Telemetry Scan   Final Result      Telemetry Scan   Final Result                  Echocardiogram:          Stress Test:        Cardiac Catheterization:  No results found for this or any previous visit.        Other:      ASSESSMENT & PLAN:    Principal Problem:    CHF exacerbation  Active Problems:    CAD (coronary artery disease)    Depression, unspecified    Heart failure, unspecified    Chronic obstructive pulmonary disease, unspecified      HFrEF  Previous echocardiogram EF 40 to 45%, now down to around 30% with regional abnormalities as described, akinesis of the posterior wall inferolateral and high lateral wall, global hypokinesis  otherwise  Elevated filling pressures, grade 2 diastolic dysfunction  Mild pulmonary pretension  Likely low gradient severe arctic stenosis with moderate to severe mitral valve regurgitation, LV enlargement  Repeat echocardiogram pending  Chest x-ray consistent with pulmonary edema  proBNP 21,000  Continue gentle diuresis, additional Lasix dose tonight  Remains bradycardic stop carvedilol  Start midodrine with evidence of significant aortic valve stenosis to avoid hypotension  Avoid afterload reduction, pressures 90s over 50s  Vasopressor of choice would be Levophed if needed    Elevated troponin  High-sensitivity troponin x 2:110, 137  EKG with no acute ST or T wave segment abnormalities  Patient denies chest pain  Likely secondary to demand ischemia, strain and elevated filling pressures    Multivessel coronary artery disease  Previous remote CABG  Reported post CABG PCI  Last ischemic evaluation appears to be cardiac catheterization in 2021 with patent LIMA to the LAD,  Saphenous vein graft to the diagonal patent, saphenous vein graft to the OM patent.  30% RCA stenosis  Secondary prevention goals  Statin      Reported bradycardia  Heart rate currently mid 50s, stop carvedilol with hypotension and bradycardia      Reported hypotension, antihypertensives carvedilol on hold, midodrine 3 times daily keep systolic greater than 110 with significant aortic valve stenosis  Needs further volume off    Mental status changes  Etiology unclear, multifactorial  Head CT with no acute findings  Patient is frail but currently appears oriented to place and arturo    Naveed Morin MD, PhD    Tasha Claire, APRCAROLINE  08/01/24  09:15 EDT

## 2024-08-01 NOTE — ED NOTES
Pt is from [facility] and they called because they said pt was in and out of responsiveness. EMS states pt has been conscious and A&OX4 the whole time. Pt is on 4L nasal cannula at all times. EMS states blood pressure was low. EMS gave pt a 500mL fluid bolus.

## 2024-08-01 NOTE — DISCHARGE PLACEMENT REQUEST
"Helena Luna (84 y.o. Male)       Date of Birth   1940    Social Security Number       Address   [address] [place] [address]    Home Phone   [phone]    MRN   [id number]       [employer]   [employer]    Marital Status                               Admission Date   7/31/24    Admission Type   Emergency    Admitting Provider   Fani Perera MD    Attending Provider   Brad Alicea MD    Department, Room/Bed   [facility] EMERGENCY DEPARTMENT, 25/25       Discharge Date       Discharge Disposition       Discharge Destination                                 Attending Provider: Brad Alicea MD    Allergies: Codeine, Latex, Sulfa Antibiotics    Isolation: None   Infection: None   Code Status: CPR    Ht: 170.2 cm (67\")   Wt: 57.7 kg (127 lb 3.3 oz)    Admission Cmt: None   Principal Problem: CHF exacerbation [I50.9]                   Active Insurance as of 7/31/2024       Primary Coverage       Payor Plan Insurance Group Employer/Plan Group    MEDICARE MEDICARE A & B        Payor Plan Address Payor Plan Phone Number Payor Plan Fax Number Effective Dates    [address] [phone]  4/1/2004 - None Entered    [facility] [id number]         Subscriber Name Subscriber Birth Date Member ID       HELENA LUNA 1940 [id number]               Secondary Coverage       Payor Plan Insurance Group Employer/Plan Group    [employer]   [place]                     Coverage Address Coverage Phone Number Coverage Fax Number Effective Dates    [address] [phone]  1/1/2013 - None Entered    [facility] [id number]         Subscriber Name Subscriber Birth Date Member ID       HELENA LUNA [id number]                     Emergency Contacts        (Rel.) Home Phone Work Phone Mobile Phone    DELMA MARTINEZ(POA) (Other) -- -- [phone]                "

## 2024-08-01 NOTE — CASE MANAGEMENT/SOCIAL WORK
Continued Stay Note   Eduardo     Patient Name: Andrea Luna  MRN: [id number]  Today's Date: 8/1/2024    Admit Date: 7/31/2024    Plan: Return to [facility]. Will need transportation at [place]   Discharge Plan       Row Name 08/01/24 1520       Plan    Plan Return to [facility]. Will need transportation at [place]    Plan Comments  spoke with patient at bedside. He is alert/oriented to person/place/date. He reports he intends to return to [facility] at [place].He gave  permission to contact [facility] about him returning there at [place]. I spoke with Li at [facility].She states he can return there at [place] as long as he doesn't have a significant change in condition,and cannot have an IV. She reports he is on O2 @2L at facility. Anticipate return to [facility].                   Discharge Codes    No documentation.                 Megan Mars RN, [facility]  Office: [phone]  Fax: [phone]  [email or web address]      I met with patient in room wearing PPE: mask    Maintained distance greater than six feet and spent </=15 minutes in the room      Megan Mars RN

## 2024-08-01 NOTE — CASE MANAGEMENT/SOCIAL WORK
Discharge Planning Assessment   Eduardo     Patient Name: Andrea Luna  MRN: [id number]  Today's Date: 8/1/2024    Admit Date: 7/31/2024    Plan: From [facility]. Will need d/c transport.   Discharge Needs Assessment       Row Name 08/01/24 0946       Living Environment    People in Home facility resident    Name(s) of People in Home [facility]    Current Living Arrangements assisted living facility    Potentially Unsafe Housing Conditions none    In the past 12 months has the electric, gas, oil, or water company threatened to shut off services in your home? No    Primary Care Provided by other (see comments)  Facility staff    Provides Primary Care For no one    Family Caregiver if Needed other (see comments)  Facility Staff    Quality of Family Relationships unable to assess    Able to Return to Prior Arrangements yes       Resource/Environmental Concerns    Resource/Environmental Concerns none    Transportation Concerns none       Transportation Needs    In the past 12 months, has lack of transportation kept you from medical appointments or from getting medications? no    In the past 12 months, has lack of transportation kept you from meetings, work, or from getting things needed for daily living? No       Food Insecurity    Within the past 12 months, you worried that your food would run out before you got the money to buy more. Never true    Within the past 12 months, the food you bought just didn't last and you didn't have money to get more. Never true       Transition Planning    Patient/Family Anticipates Transition to long-term care facility  [facility]    Patient/Family Anticipated Services at Transition none    Transportation Anticipated health plan transportation       Discharge Needs Assessment    Readmission Within the Last 30 Days no previous admission in last 30 days    Equipment Currently Used at Home grab bar;shower chair;cane, straight;wheelchair;walker, standard;oxygen     Concerns to be Addressed no discharge needs identified;denies needs/concerns at this time    Anticipated Changes Related to Illness none    Equipment Needed After Discharge none    Provided Post Acute Provider List? N/A    Provided Post Acute Provider Quality & Resource List? N/A                   Discharge Plan       Row Name 08/01/24 0952       Plan    Plan From [facility]. Will need d/c transport.    Patient/Family in Agreement with Plan yes    Provided Post Acute Provider List? N/A    Provided Post Acute Provider Quality & Resource List? N/A    Plan Comments CM met with patient at the bedside. Confirmed PCP, insurance, and pharmacy. Patient declines M2B. Patient denies any difficulty affording food, utilities, or medications. Patient lives at [facility], is mostly dependent for IADLs. PT comes one time a week. Home O2. Will need transport at d/c.                   Continued Care and Services - Admitted Since 7/31/2024       Destination       Service Provider Request Status Selected Services Address Phone Fax Patient Preferred    [facility] Pending - Request Sent N/A [id number] [facility] [address] [phone] --                     Demographic Summary       Row Name 08/01/24 0945       General Information    Admission Type observation    Arrived From emergency department    Required Notices Provided Observation Status Notice    Referral Source admission list    Reason for Consult discharge planning    Preferred Language English       Contact Information    Permission Granted to Share Info With     Contact Information Obtained for                    Functional Status       Row Name 08/01/24 0946       Functional Status    Usual Activity Tolerance moderate    Current Activity Tolerance fair       Functional Status, IADL    Medications assistive equipment and person    Meal Preparation assistive equipment and person    Housekeeping assistive equipment and  person    Shopping assistive equipment and person       Mental Status    General Appearance WDL WDL       Mental Status Summary    Recent Changes in Mental Status/Cognitive Functioning no changes                   Patient Forms       Row Name 08/01/24 0913       Patient Forms    Important Message from Medicare ([facility]) Delivered  HOANG per CM 8/1/24    Delivered to Patient    Method of delivery In person          Traci Cano RN    [phone]  [email or web address]

## 2024-08-01 NOTE — H&P
"[facility] Medicine Services  History & Physical    Patient Name: Andrea Luna  : 1940  MRN: [id number]  Primary Care Physician:  Emily Barahona DO  Date of admission: 2024  Date and Time of Service: 2024 at 0300      Assessment & Plan      Chief Complaint: less responsive, hypotension    Plan:    Acute on chronic CHF  Elevated Troponin  -Patient denies chest pain  -EKG reviewed SR, HR 73  -Chest x-ray reviewed, worsening pulmonary edema  -proBNP 21,470  -Troponin 110, 137  -EF 40 to 45% in   -IV Lasix given in ED, continue  -Continue home Coreg, Aspirin  -Consult cardiology    Hyperlipidemia  -Continue atorvastatin    History of Present Illness     History of Present Illness: Andrea Luna is a 84 y.o. male with a previous medical history of CHF, CAD, depression, hyperlipidemia who presented to [facility] on 2024 from a residential facility because he was \" in and out of responsiveness\".  Per EMS report he was hypotensive and bradycardic which improved after  he was given a 500 mL fluid bolus.  He denies chest pain, shortness of breath or palpitations.  He says that he felt well today and had sat down in his chair to sleep but woke up feeling confused and fatigued.  He could not do anything or think clearly. He reported feeling better on arrival to the ED.    In the ED, EKG reviewed and showed sinus rhythm, HR 73.  proBNP was 21,470, troponins were 110, 137.  Creatinine is 1.41 (baseline 0.95), alkaline phosphatase 149.  Otherwise, labs are unremarkable.  He was afebrile, all vital signs are stable.  He is on chronic O2 at 4 L per NC at all times, SpO2 is 100%.     12 point ROS reviewed and negative except as mentioned above    Objective      Vitals:   Temp:  [98 °F (36.7 °C)] 98 °F (36.7 °C)  Heart Rate:  [67-83] 69  Resp:  [16] 16  BP: (121-134)/(60-72) 133/72  Body mass index is 20.92 kg/m².    Physical Exam  Vitals and nursing note reviewed. "   Constitutional:       Appearance: Normal appearance.   HENT:      Mouth/Throat:      Mouth: Mucous membranes are moist.   Cardiovascular:      Rate and Rhythm: Normal rate and regular rhythm.   Pulmonary:      Effort: Pulmonary effort is normal.      Breath sounds: Normal breath sounds.   Abdominal:      General: Bowel sounds are normal.      Palpations: Abdomen is soft.   Musculoskeletal:         General: Normal range of motion.   Skin:     General: Skin is warm and dry.      Coloration: Skin is pale.   Neurological:      General: No focal deficit present.      Mental Status: He is alert and oriented to person, place, and time. Mental status is at baseline.   Psychiatric:         Mood and Affect: Mood normal.         Behavior: Behavior normal.        Personal History     This is a 84 y.o. male with:    No past medical history on file.    No past surgical history on file.    Active and Resolved Problems  Active Hospital Problems    Diagnosis  POA    **CHF exacerbation [I50.9]  Yes    CAD (coronary artery disease) [I25.10]  Yes    Heart failure, unspecified [I50.9]  Yes    Chronic obstructive pulmonary disease, unspecified [J44.9]  Yes    Depression, unspecified [F32.A]  Yes      Resolved Hospital Problems   No resolved problems to display.       Family History: family history is not on file. Otherwise pertinent FHx was reviewed and not pertinent to current issue.    Social History:      Home Medications:  Prior to Admission Medications       None              Allergies:  Allergies   Allergen Reactions    Codeine Rash    Latex Rash     Other reaction(s): ; 5/3/2006 11:36:20 AM    Sulfa Antibiotics Rash           VTE Prophylaxis:  Mechanical VTE prophylaxis orders are present.        CODE STATUS:    Code Status (Patient has no pulse and is not breathing): CPR (Attempt to Resuscitate)  Medical Interventions (Patient has pulse or is breathing): Full Support        Admission Status:  I believe this patient meets  observation status.    I discussed the patient's findings and my recommendations with patient and family.    Signature:     This document has been electronically signed by Kathryn Mckay DNP, APRN, AGACNP-BC on August 1, 2024 03:48 EDT   [facility] Hospitalist Team

## 2024-08-02 ENCOUNTER — APPOINTMENT (OUTPATIENT)
Dept: CT IMAGING | Facility: HOSPITAL | Age: 84
End: 2024-08-02
Payer: MEDICARE

## 2024-08-02 PROBLEM — E43 SEVERE MALNUTRITION: Status: ACTIVE | Noted: 2024-08-02

## 2024-08-02 LAB
ANION GAP SERPL CALCULATED.3IONS-SCNC: 7.3 MMOL/L (ref 5–15)
BUN SERPL-MCNC: 31 MG/DL (ref 8–23)
BUN/CREAT SERPL: 19.9 (ref 7–25)
CALCIUM SPEC-SCNC: 8.6 MG/DL (ref 8.6–10.5)
CHLORIDE SERPL-SCNC: 103 MMOL/L (ref 98–107)
CO2 SERPL-SCNC: 28.7 MMOL/L (ref 22–29)
CREAT SERPL-MCNC: 1.56 MG/DL (ref 0.76–1.27)
DEPRECATED RDW RBC AUTO: 56.9 FL (ref 37–54)
EGFRCR SERPLBLD CKD-EPI 2021: 43.3 ML/MIN/1.73
ERYTHROCYTE [DISTWIDTH] IN BLOOD BY AUTOMATED COUNT: 16 % (ref 12.3–15.4)
GLUCOSE SERPL-MCNC: 88 MG/DL (ref 65–99)
HCT VFR BLD AUTO: 30.1 % (ref 37.5–51)
HGB BLD-MCNC: 9.2 G/DL (ref 13–17.7)
MCH RBC QN AUTO: 29.7 PG (ref 26.6–33)
MCHC RBC AUTO-ENTMCNC: 30.6 G/DL (ref 31.5–35.7)
MCV RBC AUTO: 97.1 FL (ref 79–97)
PLATELET # BLD AUTO: 113 10*3/MM3 (ref 140–450)
PMV BLD AUTO: 10.5 FL (ref 6–12)
POTASSIUM SERPL-SCNC: 3.4 MMOL/L (ref 3.5–5.2)
RBC # BLD AUTO: 3.1 10*6/MM3 (ref 4.14–5.8)
SODIUM SERPL-SCNC: 139 MMOL/L (ref 136–145)
WBC NRBC COR # BLD AUTO: 4.71 10*3/MM3 (ref 3.4–10.8)

## 2024-08-02 PROCEDURE — G0378 HOSPITAL OBSERVATION PER HR: HCPCS

## 2024-08-02 PROCEDURE — 25010000002 FUROSEMIDE PER 20 MG

## 2024-08-02 PROCEDURE — 85027 COMPLETE CBC AUTOMATED: CPT

## 2024-08-02 PROCEDURE — 74178 CT ABD&PLV WO CNTR FLWD CNTR: CPT

## 2024-08-02 PROCEDURE — 97162 PT EVAL MOD COMPLEX 30 MIN: CPT

## 2024-08-02 PROCEDURE — 80048 BASIC METABOLIC PNL TOTAL CA: CPT

## 2024-08-02 PROCEDURE — 97165 OT EVAL LOW COMPLEX 30 MIN: CPT

## 2024-08-02 PROCEDURE — 25010000002 FUROSEMIDE PER 20 MG: Performed by: INTERNAL MEDICINE

## 2024-08-02 PROCEDURE — 25510000001 IOPAMIDOL PER 1 ML: Performed by: STUDENT IN AN ORGANIZED HEALTH CARE EDUCATION/TRAINING PROGRAM

## 2024-08-02 PROCEDURE — 99215 OFFICE O/P EST HI 40 MIN: CPT | Performed by: INTERNAL MEDICINE

## 2024-08-02 RX ORDER — POTASSIUM CHLORIDE 20 MEQ/1
40 TABLET, EXTENDED RELEASE ORAL ONCE
Status: DISCONTINUED | OUTPATIENT
Start: 2024-08-02 | End: 2024-08-02

## 2024-08-02 RX ORDER — MIDODRINE HYDROCHLORIDE 5 MG/1
5 TABLET ORAL
Status: DISCONTINUED | OUTPATIENT
Start: 2024-08-02 | End: 2024-08-03

## 2024-08-02 RX ORDER — FUROSEMIDE 10 MG/ML
20 INJECTION INTRAMUSCULAR; INTRAVENOUS
Status: COMPLETED | OUTPATIENT
Start: 2024-08-02 | End: 2024-08-04

## 2024-08-02 RX ORDER — FUROSEMIDE 10 MG/ML
20 INJECTION INTRAMUSCULAR; INTRAVENOUS
Status: DISCONTINUED | OUTPATIENT
Start: 2024-08-02 | End: 2024-08-02

## 2024-08-02 RX ORDER — POTASSIUM CHLORIDE 20 MEQ/1
40 TABLET, EXTENDED RELEASE ORAL ONCE
Status: COMPLETED | OUTPATIENT
Start: 2024-08-02 | End: 2024-08-02

## 2024-08-02 RX ADMIN — PANTOPRAZOLE SODIUM 40 MG: 40 TABLET, DELAYED RELEASE ORAL at 06:03

## 2024-08-02 RX ADMIN — TRAMADOL HYDROCHLORIDE 50 MG: 50 TABLET ORAL at 20:48

## 2024-08-02 RX ADMIN — FUROSEMIDE 20 MG: 10 INJECTION, SOLUTION INTRAMUSCULAR; INTRAVENOUS at 17:29

## 2024-08-02 RX ADMIN — IOPAMIDOL 100 ML: 755 INJECTION, SOLUTION INTRAVENOUS at 10:12

## 2024-08-02 RX ADMIN — Medication 1 TABLET: at 09:02

## 2024-08-02 RX ADMIN — Medication 10 ML: at 20:53

## 2024-08-02 RX ADMIN — SERTRALINE HYDROCHLORIDE 25 MG: 25 TABLET ORAL at 09:02

## 2024-08-02 RX ADMIN — ASPIRIN 81 MG: 81 TABLET, CHEWABLE ORAL at 09:02

## 2024-08-02 RX ADMIN — ATORVASTATIN CALCIUM 40 MG: 40 TABLET, FILM COATED ORAL at 20:48

## 2024-08-02 RX ADMIN — FUROSEMIDE 20 MG: 10 INJECTION, SOLUTION INTRAMUSCULAR; INTRAVENOUS at 03:07

## 2024-08-02 RX ADMIN — POTASSIUM CHLORIDE 40 MEQ: 1500 TABLET, EXTENDED RELEASE ORAL at 17:29

## 2024-08-02 RX ADMIN — Medication 10 ML: at 09:02

## 2024-08-02 NOTE — CONSULTS
"Nutrition Services    Patient Name: Andrea Luna  YOB: 1940  MRN: [id number]  Admission date: 7/31/2024    Comment:    Addition of Boost Plus BID (Provides 720 kcals, 28 g protein if consumed)   Boost Glucose Control may be substituted for Boost Plus at this time, due to national shortage of many ONS products. If substituted, each Boost Glucose Control will provide 190 kcal and 16g PRO.  Pt likes both all ONS flavors.    Severe chronic disease related malnutrition related to multiple chronic diseases (COPD, CHF, depression) as evidenced by PO intake meeting less than 75% of estimated energy needs for greater than or equal to 1 month, moderate fluid accumulation, and severe muscle and fat wasting per NFPE.    See MSA below.    CLINICAL NUTRITION ASSESSMENT      Reason for Assessment 8/2: MST, nursing admission screen consult      H&P      Past Medical History:   Diagnosis Date    SRIKANTH (acute kidney injury)     Aortic valve stenosis     Cervical spinal stenosis     CHF (congestive heart failure)     Coronary artery disease     DDD (degenerative disc disease), cervical     Elevated cholesterol     Hyperlipidemia     Hypertension     Pneumonia        Past Surgical History:   Procedure Laterality Date    CORONARY ARTERY BYPASS GRAFT          Current Problems   Acute on chronic CHF  -cardiology following     HLD  COPD  Depression        Encounter Information        Trending Narrative     8/2: Pt admitted to [facility] from residential facility because he was \"in and out of responsiveness.\" Admitting dx of CHF exacerbation. Urology following - pt s/p bedside cystoscopy. RD visited pt at bedside. Pt reports he has not been eating well at home, stating he doesn't have an appetite. Pt says he has been \"making himself eat.\" Pt does not drink ONS regularly. Pt agreeable to chocolate ONS here. Pt denied food allergies and difficulty chewing/swallowing NFPE completed, consistent with nutrition diagnosis of malnutrition " "using AND/ASPEN criteria. See MSA below.        Anthropometrics        Current Height, Weight Height: 167.6 cm (66\")  Weight: 59.9 kg (132 lb 0.9 oz) (08/01/24 2214)       Usual Body Weight (UBW) 125# per pt       Trending Weight Hx     This admission: 8/2: 132# - scale             PTA: No wt hx to review     Wt Readings from Last 30 Encounters:   08/01/24 2214 59.9 kg (132 lb 0.9 oz)   08/01/24 0700 57.6 kg (127 lb)   08/01/24 0447 57.7 kg (127 lb 3.3 oz)   07/25/23 1346 60.6 kg (133 lb 9.6 oz)      BMI kg/m2 Body mass index is 21.31 kg/m².       Labs        Pertinent Labs    Results from last 7 days   Lab Units 08/02/24  0306 08/01/24  0417 08/01/24  0014   SODIUM mmol/L 139 141 139   POTASSIUM mmol/L 3.4* 3.7 4.2   CHLORIDE mmol/L 103 106 104   CO2 mmol/L 28.7 25.5 24.4   BUN mg/dL 31* 22 22   CREATININE mg/dL 1.56* 1.26 1.41*   CALCIUM mg/dL 8.6 8.1* 9.1   BILIRUBIN mg/dL  --   --  0.7   ALK PHOS U/L  --   --  149*   ALT (SGPT) U/L  --   --  10   AST (SGOT) U/L  --   --  32   GLUCOSE mg/dL 88 85 86     Results from last 7 days   Lab Units 08/02/24  0306   HEMOGLOBIN g/dL 9.2*   HEMATOCRIT % 30.1*     Lab Results   Component Value Date    HGBA1C 5.5 06/01/2021        Medications    Scheduled Medications aspirin, 81 mg, Oral, Daily  atorvastatin, 40 mg, Oral, Nightly  midodrine, 5 mg, Oral, TID AC  multivitamin with minerals, 1 tablet, Oral, Daily  pantoprazole, 40 mg, Oral, Q AM  sertraline, 25 mg, Oral, Daily  sodium chloride, 10 mL, Intravenous, Q12H  traMADol, 50 mg, Oral, Nightly        Infusions      PRN Medications   acetaminophen **OR** acetaminophen **OR** acetaminophen    albuterol    senna-docusate sodium **AND** polyethylene glycol **AND** bisacodyl **AND** bisacodyl    melatonin    nitroglycerin    ondansetron    sodium chloride    sodium chloride    sodium chloride     Physical Findings        Trending Physical   Appearance, NFPE 8/2: NFPE completed, consistent with nutrition diagnosis of " malnutrition using AND/ASPEN criteria. See MSA below.      --  Edema  2+ legs     Bowel Function None documented      Tubes None      Chewing/Swallowing No reported difficulty      Skin Stage 1 pressure injury to coccyx - no WOCN assessment      --  Current Nutrition Orders & Evaluation of Intake       Oral Nutrition     Food Allergies NKFA   Current PO Diet Diet: Cardiac; Healthy Heart (2-3 Na+); Fluid Consistency: Thin (IDDSI 0)   Supplement -   PO Evaluation     Trending % PO Intake 8/2: 50% x 4 meals recorded    --  Nutritional Risk Screening        NRS-2002 Score          Nutrition Diagnosis         Nutrition Dx Problem 1 Severe chronic disease related malnutrition related to multiple chronic diseases (COPD, CHF, depression) as evidenced by PO intake meeting less than 75% of estimated energy needs for greater than or equal to 1 month, moderate fluid accumulation, and severe muscle and fat wasting per NFPE.      Nutrition Dx Problem 2        Intervention Goal         Intervention Goal(s) PO intake > 50%, ONS acceptance     Nutrition Intervention        RD Action Addition of ONS  NFPE completed     Nutrition Prescription          Diet Prescription Healthy heart   Supplement Prescription Boost Plus BID (Provides 720 kcals, 28 g protein if consumed)      --  Monitor/Evaluation        Monitor Per protocol, PO intake, Supplement intake, Pertinent labs, Weight     Malnutrition Severity Assessment      Patient meets criteria for : Severe Malnutrition  Malnutrition Type (Last 8 Hours)       Malnutrition Severity Assessment       Row Name 08/02/24 1406       Malnutrition Severity Assessment    Malnutrition Type Chronic Disease - Related Malnutrition      Row Name 08/02/24 1406       Insufficient Energy Intake     Insufficient Energy Intake Findings Severe    Insufficient Energy Intake  <75% of est. energy requirement for > or equal to 1 month      Row Name 08/02/24 1406       Muscle Loss    Loss of Muscle Mass Findings  Severe    [place] Region Severe - deep hollowing/scooping, lack of muscle to touch, facial bones well defined    Clavicle Bone Region Severe - protruding prominent bone    Acromion Bone Region Severe - squared shoulders, bones, and acromion process protrusion prominent    Scapular Bone Region Severe - prominent bones, depressions easily visible between ribs, scapula, spine, shoulders    Dorsal Hand Region Moderate - slight depression    Patellar Region --  NYA r/t edema    Anterior Thigh Region --  NYA r/t edema    Posterior Calf Region --  NYA r/t edema      Row Name 08/02/24 1406       Fat Loss    Subcutaneous Fat Loss Findings Severe    Orbital Region  Severe - pronounced hollowness/depression, dark circles, loose saggy skin    Upper Arm Region Severe - mostly skin, very little space between folds, fingers touch      Row Name 08/02/24 1406       Fluid Accumulation (Edema)    Fluid Acumulation Findings Moderate    Fluid Accumulation  Moderate equals 2+ pitting edema      Row Name 08/02/24 1406       Criteria Met (Must meet criteria for severity in at least 2 of these categories: M Wasting, Fat Loss, Fluid, Secondary Signs, Wt. Status, Intake)    Patient meets criteria for  Severe Malnutrition                       Electronically signed by:  Corrie Ambrosio RD  08/02/24 10:03 EDT

## 2024-08-02 NOTE — THERAPY EVALUATION
Patient Name: Andrea Luna  : 1940    MRN: [id number]                              Today's Date: 2024       Admit Date: 2024    Visit Dx:     ICD-10-CM ICD-9-CM   1. Altered mental status, unspecified altered mental status type  R41.82 780.97   2. Acute on chronic congestive heart failure, unspecified heart failure type  I50.9 428.0     Patient Active Problem List   Diagnosis    CHF exacerbation    CAD (coronary artery disease)    Depression, unspecified    Heart failure, unspecified    Chronic obstructive pulmonary disease, unspecified    Severe malnutrition     Past Medical History:   Diagnosis Date    SRIKANTH (acute kidney injury)     Aortic valve stenosis     Cervical spinal stenosis     CHF (congestive heart failure)     Coronary artery disease     DDD (degenerative disc disease), cervical     Elevated cholesterol     Hyperlipidemia     Hypertension     Pneumonia      Past Surgical History:   Procedure Laterality Date    CORONARY ARTERY BYPASS GRAFT        General Information       Row Name 24 1701          OT Time and Intention    Document Type evaluation  -MP     Mode of Treatment individual therapy;occupational therapy  -MP       Row Name 24 1701          General Information    Patient Profile Reviewed yes  -MP     Prior Level of Function independent:;ADL's  -MP     Existing Precautions/Restrictions fall;oxygen therapy device and L/min  -MP       Row Name 24 1701          Living Environment    People in Home facility resident  -MP       Row Name 24 1701          Cognition    Orientation Status (Cognition) oriented to;person;place;time;disoriented to;situation  -MP       Row Name 24 1701          Safety Issues, Functional Mobility    Impairments Affecting Function (Mobility) endurance/activity tolerance;balance  -MP               User Key  (r) = Recorded By, (t) = Taken By, (c) = Cosigned By      Initials Name Provider Type    Ronaldo Cabrera OT  Occupational Therapist                     Mobility/ADL's       Row Name 08/02/24 1702          Bed Mobility    Bed Mobility supine-sit  -MP     Supine-Sit [place] (Bed Mobility) moderate assist (50% patient effort)  -MP       Row Name 08/02/24 1702          Sit-Stand Transfer    Sit-Stand [place] (Transfers) moderate assist (50% patient effort)  -MP     Assistive Device (Sit-Stand Transfers) walker, front-wheeled  -       Row Name 08/02/24 1702          Activities of Daily Living    BADL Assessment/Intervention toileting  -[facility] Name 08/02/24 1702          Toileting Assessment/Training    [place] Level (Toileting) toileting skills;maximum assist (25% patient effort)  -MP               User Key  (r) = Recorded By, (t) = Taken By, (c) = Cosigned By      Initials Name Provider Type    Ronaldo Cabrera OT Occupational Therapist                   Obj/Interventions       Row Name 08/02/24 1703          Range of Motion Comprehensive    Comment, General Range of Motion BUE WFL  -[facility] Name 08/02/24 1703          Strength Comprehensive (MMT)    Comment, General Manual Muscle Testing (MMT) Assessment BUE 4-/5  -MP       [facility] Name 08/02/24 1703          Balance    Balance Interventions sitting;standing;sit to stand;supported;static;dynamic  -               User Key  (r) = Recorded By, (t) = Taken By, (c) = Cosigned By      Initials Name Provider Type    Ronaldo Cabrera OT Occupational Therapist                   Goals/Plan       Row Name 08/02/24 1706          Bed Mobility Goal 1 (OT)    Activity/Assistive Device (Bed Mobility Goal 1, OT) bed mobility activities, all  -MP     [place] Level/Cues Needed (Bed Mobility Goal 1, OT) minimum assist (75% or more patient effort)  -MP     Time Frame (Bed Mobility Goal 1, OT) long term goal (LTG);2 weeks  -MP       Row Name 08/02/24 1706          Transfer Goal 1 (OT)    Activity/Assistive Device (Transfer Goal 1, OT)  sit-to-stand/stand-to-sit;toilet  -MP     [place] Level/Cues Needed (Transfer Goal 1, OT) minimum assist (75% or more patient effort)  -MP     Time Frame (Transfer Goal 1, OT) long term goal (LTG);2 weeks  -MP       Row Name 08/02/24 1706          Dressing Goal 1 (OT)    Activity/Device (Dressing Goal 1, OT) lower body dressing  -MP     [place]/Cues Needed (Dressing Goal 1, OT) moderate assist (50-74% patient effort)  -MP     Time Frame (Dressing Goal 1, OT) long term goal (LTG);2 weeks  -MP               User Key  (r) = Recorded By, (t) = Taken By, (c) = Cosigned By      Initials Name Provider Type    Ronaldo Cabrera OT Occupational Therapist                   Clinical Impression       Row Name 08/02/24 1703          Pain Assessment    Pretreatment Pain Rating 0/10 - no pain  -MP     Posttreatment Pain Rating 0/10 - no pain  -MP       Row Name 08/02/24 1703          Plan of Care Review    Plan of Care Reviewed With patient  -MP     Progress no change  -MP     Outcome Evaluation Pt is an 85 y/o male who presents to [facility] for being in and out of responsiveness. He was found to be hypotensive, bradycardia, AMS, and elevated troponin. Pt in CHF exacerbation. Pt. admit from [facility], maintains ADL independence at baseline. Pt. oriented to self and place this date, requires mod A for bed mobility w/ severe posteior lean. Pt. attempts to toilet at EOB w/ max A to uitlize urinal and manage brief, blood in urine and nsg notified. Pt. requires mod A sit to stand transfer. Not safe to d/c home at this time and will require SNF placement at d/c to address aforementioned deficits.  -MP       Row Name 08/02/24 1703          Therapy Assessment/Plan (OT)    Rehab Potential (OT) good, to achieve stated therapy goals  -MP     Criteria for Skilled Therapeutic Interventions Met (OT) yes;meets criteria;skilled treatment is necessary  -MP     Therapy Frequency (OT) 3 times/wk  -MP       Row Name 08/02/24 1703          Therapy  Plan Review/Discharge Plan (OT)    Anticipated Discharge Disposition (OT) skilled nursing facility  -       Row Name 08/02/24 1703          Vital Signs    Pre Patient Position Supine  -MP     Intra Patient Position Standing  -MP     Post Patient Position Supine  -MP       Row Name 08/02/24 1703          Positioning and Restraints    Pre-Treatment Position in bed  -MP     Post Treatment Position bed  -MP     In Bed supine;encouraged to call for assist;call light within reach;exit alarm on  -MP               User Key  (r) = Recorded By, (t) = Taken By, (c) = Cosigned By      Initials Name Provider Type    Ronaldo Cabrera, OT Occupational Therapist                   Outcome Measures       Row Name 08/02/24 1113 08/02/24 0828       How much help from another person do you currently need...    Turning from your back to your side while in flat bed without using bedrails? 3  -DMITRI 3  -KU    Moving from lying on back to sitting on the side of a flat bed without bedrails? 3  -DMITRI 3  -KU    Moving to and from a bed to a chair (including a wheelchair)? 3  -DMITRI 3  -KU    Standing up from a chair using your arms (e.g., wheelchair, bedside chair)? 3  -DMITRI 3  -KU    Climbing 3-5 steps with a railing? 2  -DMITRI 2  -KU    To walk in hospital room? 3  -DMITRI 2  -KU    AM-PAC 6 Clicks Score (PT) 17  -DMITRI 16  -KU    Highest Level of Mobility Goal 5 --> Static standing  -DMITRI 5 --> Static standing  -KU      Row Name 08/02/24 1113          Functional Assessment    Outcome Measure Options AM-PAC 6 Clicks Basic Mobility (PT)  -DMITRI               User Key  (r) = Recorded By, (t) = Taken By, (c) = Cosigned By      Initials Name Provider Type    Ambar Rivero, RN Registered Nurse    Kavitha Kulkarni, MONY Physical Therapist                    Occupational Therapy Education       Title: PT OT SLP Therapies (In Progress)       Topic: Occupational Therapy (In Progress)       Point: ADL training (Not Started)       Description:   Instruct  learner(s) on proper safety adaptation and remediation techniques during self care or transfers.   Instruct in proper use of assistive devices.                  Learner Progress:  Not documented in this visit.              Point: Home exercise program (Not Started)       Description:   Instruct learner(s) on appropriate technique for monitoring, assisting and/or progressing therapeutic exercises/activities.                  Learner Progress:  Not documented in this visit.              Point: Precautions (Not Started)       Description:   Instruct learner(s) on prescribed precautions during self-care and functional transfers.                  Learner Progress:  Not documented in this visit.              Point: Body mechanics (Done)       Description:   Instruct learner(s) on proper positioning and spine alignment during self-care, functional mobility activities and/or exercises.                  Learning Progress Summary             Patient Acceptance, E,TB, VU,NR by MIKEL at 8/2/2024 5023                                         User Key       Initials Effective Dates Name Provider Type Discipline    MIKEL 06/16/21 -  Ronaldo Philip OT Occupational Therapist OT                  OT Recommendation and Plan  Therapy Frequency (OT): 3 times/wk  Plan of Care Review  Plan of Care Reviewed With: patient  Progress: no change  Outcome Evaluation: Pt is an 85 y/o male who presents to [facility] for being in and out of responsiveness. He was found to be hypotensive, bradycardia, AMS, and elevated troponin. Pt in CHF exacerbation. Pt. admit from [facility], maintains ADL independence at baseline. Pt. oriented to self and place this date, requires mod A for bed mobility w/ severe posteior lean. Pt. attempts to toilet at EOB w/ max A to uitlize urinal and manage brief, blood in urine and nsg notified. Pt. requires mod A sit to stand transfer. Not safe to d/c home at this time and will require SNF placement at d/c to address aforementioned  deficits.     Time Calculation:         Time Calculation- OT       Row Name 08/02/24 1706             Time Calculation- OT    OT Start Time 1530  -MP      OT Stop Time 1552  -      OT Time Calculation (min) 22 min  -      Total Timed Code Minutes- OT 0 minute(s)  -MP      OT Received On 08/02/24  -      OT - Next Appointment 08/05/24  -      OT Goal Re-Cert Due Date 08/16/24  -                User Key  (r) = Recorded By, (t) = Taken By, (c) = Cosigned By      Initials Name Provider Type    Ronaldo Cabrera OT Occupational Therapist                  Therapy Charges for Today       Code Description Service Date Service Provider Modifiers Qty    15578658096 HC OT EVAL LOW COMPLEXITY 4 8/2/2024 Ronaldo Philip OT GO 1                 Ronaldo Philip OT  8/2/2024

## 2024-08-02 NOTE — PLAN OF CARE
Goal Outcome Evaluation:    Pt is an 83 y/o male who presents to [facility] for being in and out of responsiveness. He was found to be hypotensive, bradycardia, AMS, and elevated troponin. Pt in CHF exacerbation. Pt is A/O person, place, and time. He is typically independent with household ambulation using a rollator. At this time pt requires SBA for bed mobility, CGA-min A for transfers due to poor eccentric control, and CGA for short distance ambulation using RW. Pt presents with poor gait mechanics requiring increased time with turns and poor safety awareness. He will need SNF at discharge as he remains a high risk for falls and unsafe to ambulate without assist.       Anticipated Discharge Disposition (PT): skilled nursing facility

## 2024-08-02 NOTE — CONSULTS
Urology Consult Note    Patient:Andrea Luna :1940  Room:[facility]  Admit Date2024  Age:84 y.o.     SEX:male     DOS:2024     MR:[id number]     Visit:[id number]       Attending: Kelly Rodriguez MD  Referring Provider: Dr Rodriguez  Reason for Consultation: Gross hematuria    Patient Care Team:  Emily Barahona DO as PCP - General (Family Medicine)    Chief complaint blood in urine    Subjective .     History of present illness: 84-year-old male who is fairly poor historian.  He has been having blood from his penis for several days.  He reports on attempted catheterization at outside hospital.  He does not know when this was.  He denies any voiding difficulties.  According to the nurse he has had a few blood clots.  Patient is on aspirin but no significant blood thinners.    Patient has been seen by Dr. Hayes in the past.  He has been treated for prostate cancer with radiation therapy.  He also has a history of a low-grade noninvasive bladder cancer.  His last surveillance cystoscopy was in 2023 at which time no tumors were seen.    Review of Systems  10 point review of systems were reviewed and are negative except for:  Constitution:  positive for See HPI    History  Past Medical History:   Diagnosis Date    SRIKANTH (acute kidney injury)     Aortic valve stenosis     Cervical spinal stenosis     CHF (congestive heart failure)     Coronary artery disease     DDD (degenerative disc disease), cervical     Elevated cholesterol     Hyperlipidemia     Hypertension     Pneumonia      Past Surgical History:   Procedure Laterality Date    CORONARY ARTERY BYPASS GRAFT       Social History     Socioeconomic History    Marital status:    Tobacco Use    Smoking status: Never   Vaping Use    Vaping status: Never Used   Substance and Sexual Activity    Alcohol use: Never    Drug use: Not Currently     History reviewed. No pertinent family history.  Allergy  Allergies   Allergen Reactions    Codeine  Rash    Latex Rash     Other reaction(s): ; 5/3/2006 11:36:20 AM    Sulfa Antibiotics Rash     Prior to Admission medications    Medication Sig Start Date End Date Taking? Authorizing Provider   albuterol sulfate  (90 Base) MCG/ACT inhaler Inhale 2 puffs Every 4 (Four) Hours As Needed for Wheezing.    Abbe May MD   aspirin 81 MG chewable tablet Chew 1 tablet Daily.    Abbe May MD   atorvastatin (LIPITOR) 40 MG tablet Take 1 tablet by mouth Every Night.    Abbe May MD   carvedilol (COREG) 6.25 MG tablet Take 1 tablet by mouth 2 (Two) Times a Day With Meals.    Abbe May MD   docusate sodium (COLACE) 100 MG capsule Take 1 capsule by mouth Daily.    Abbe May MD   Ferrous Sulfate 220 (44 Fe) MG/5ML oral solution Take 5 mL by mouth Daily.    Abbe May MD   furosemide (LASIX) 20 MG tablet Take 1 tablet by mouth 2 (Two) Times a Day.    Abbe May MD   lactulose (CHRONULAC) 10 GM/15ML solution Take 30 mL by mouth 2 (Two) Times a Day As Needed.    Abbe May MD   multivitamin with minerals tablet tablet Take 1 tablet by mouth Daily.    Abbe May MD   omeprazole (priLOSEC) 20 MG capsule Take 1 capsule by mouth Daily.    Abbe May MD   ondansetron (ZOFRAN) 4 MG tablet Take 1 tablet by mouth Every 4 (Four) Hours As Needed for Nausea or Vomiting.    Abbe May MD   polyethylene glycol (MiraLax) 17 g packet Take 17 g by mouth Daily.    Abbe May MD   potassium chloride (KLOR-CON M20) 20 MEQ CR tablet Take 1 tablet by mouth Daily.    Abbe May MD   sertraline (ZOLOFT) 25 MG tablet Take 1 tablet by mouth Daily.    Abbe May MD   traMADol (ULTRAM) 50 MG tablet Take 1 tablet by mouth Every Night.    Abbe May MD         Objective     tMax 24 hours:  Temp (24hrs), Av.2 °F (36.2 °C), Min:96.2 °F (35.7 °C), Max:98.3 °F (36.8 °C)    Vital Sign  Ranges:  Temp:  [96.2 °F (35.7 °C)-98.3 °F (36.8 °C)] 98.3 °F (36.8 °C)  Heart Rate:  [51-68] 61  Resp:  [11-17] 14  BP: ()/(43-67) 134/67  Intake and Output Last 3 Shifts:  I/O last 3 completed shifts:  In: 260 [P.O.:60; I.V.:200]  Out: 1350 [Urine:1350]      Physical Exam:   General Appearance alert, appears stated age, and cooperative  Head normocephalic, without obvious abnormality and atraumatic  Abdomen soft non-tender, no guarding, and no rebound tenderness  Male Genitalia normal circumcised phallus with a small amount of blood present  Skin no bleeding, bruising or rash  Neurologic Mental Status orientated to person, place, time and situation    Results Review:     Lab Results (last 24 hours)       Procedure Component Value Units Date/Time    Basic Metabolic Panel [[id number]]  (Abnormal) Collected: 08/02/24 0306    Specimen: Blood Updated: 08/02/24 0457     Glucose 88 mg/dL      BUN 31 mg/dL      Creatinine 1.56 mg/dL      Sodium 139 mmol/L      Potassium 3.4 mmol/L      Chloride 103 mmol/L      CO2 28.7 mmol/L      Calcium 8.6 mg/dL      BUN/Creatinine Ratio 19.9     Anion Gap 7.3 mmol/L      eGFR 43.3 mL/min/1.73     Narrative:      GFR Normal >60  Chronic Kidney Disease <60  Kidney Failure <15    The GFR formula is only valid for adults with stable renal function between ages 18 and 70.    CBC (No Diff) [[id number]]  (Abnormal) Collected: 08/02/24 0306    Specimen: Blood Updated: 08/02/24 0440     WBC 4.71 10*3/mm3      RBC 3.10 10*6/mm3      Hemoglobin 9.2 g/dL      Hematocrit 30.1 %      MCV 97.1 fL      MCH 29.7 pg      MCHC 30.6 g/dL      RDW 16.0 %      RDW-SD 56.9 fl      MPV 10.5 fL      Platelets 113 10*3/mm3     Blood Culture - Blood, Arm, Left [[id number]]  (Normal) Collected: 08/01/24 0147    Specimen: Blood from Arm, Left Updated: 08/02/24 0200     Blood Culture No growth at 24 hours    Blood Culture - Blood, Arm, Right [[id number]]  (Normal) Collected: 08/01/24 0014    Specimen: Blood from  "Arm, Right Updated: 08/02/24 0030     Blood Culture No growth at 24 hours           No results found for: \"URINECX\"     Imaging Results (Last 7 Days)       Procedure Component Value Units Date/Time    CT Head Without Contrast [[id number]] Collected: 08/01/24 0120     Updated: 08/01/24 0124    Narrative:      CT HEAD WO CONTRAST    Date of Exam: 8/1/2024 1:00 AM EDT    Indication: ams.    Comparison: 11/27/2023.    Technique: Axial CT images were obtained of the head without contrast administration.  Coronal reconstructions were performed.  Automated exposure control and iterative reconstruction methods were used.      Findings:  Superficial soft tissues appear within normal limits. Mild left ethmoid sinus and left maxillary sinus mucosal disease status post antrostomy. Mastoids are clear. Paranasal sinuses and mastoid air cells appear well aerated.  Orbits are unremarkable.    There is no acute intracranial hemorrhage.  No mass effect or midline shift.  No abnormal extra-axial collections.  Gray-white differentiation is within normal limits. Stable patchy white matter hypoattenuation; mild to moderate in magnitude. There is   generalized parenchymal volume loss congruent with age. There are small chronic lacunar infarctions in the basal ganglia. Ventricular size and configuration is normal for age.      Impression:      Impression:  1.No acute intracranial abnormality.  2.Stable mild to moderate chronic small vessel ischemic change and generalized parenchymal volume loss.  3.Mild paranasal sinus mucosal disease status post antrostomy.        Electronically Signed: Sadiq Reza MD    8/1/2024 1:22 AM EDT    Workstation ID: [id number]    XR Chest 1 View [[id number]] Collected: 08/01/24 0057     Updated: 08/01/24 0100    Narrative:      XR CHEST 1 VW    Date of Exam: 8/1/2024 12:20 AM EDT    Indication: ams    Comparison: 6/27/2024    Findings:  There is worsened moderate pulmonary edema. There are stable small small " bilateral pleural effusions. There is evidence of prior median sternotomy and CABG. No pneumothorax. Pulmonary vasculature is indistinct. Cardiac silhouette is partially obscured,   but likely unchanged from the prior study. No acute osseous abnormality.      Impression:      Impression:  Worsening moderate pulmonary edema with stable small bilateral pleural effusions.        Electronically Signed: Sadiq Reza MD    8/1/2024 12:58 AM EDT    Workstation ID: [id number]            Inpatient Meds:   Scheduled Meds:aspirin, 81 mg, Oral, Daily  atorvastatin, 40 mg, Oral, Nightly  midodrine, 5 mg, Oral, TID AC  multivitamin with minerals, 1 tablet, Oral, Daily  pantoprazole, 40 mg, Oral, Q AM  sertraline, 25 mg, Oral, Daily  sodium chloride, 10 mL, Intravenous, Q12H  traMADol, 50 mg, Oral, Nightly       Continuous Infusions:    PRN Meds:.  acetaminophen **OR** acetaminophen **OR** acetaminophen    albuterol    senna-docusate sodium **AND** polyethylene glycol **AND** bisacodyl **AND** bisacodyl    melatonin    nitroglycerin    ondansetron    sodium chloride    sodium chloride    sodium chloride      Assessment & Plan     Principal Problem:    CHF exacerbation  Active Problems:    CAD (coronary artery disease)    Depression, unspecified    Heart failure, unspecified    Chronic obstructive pulmonary disease, unspecified    Gross hematuria to be due to urethral trauma from catheterization attempt  History of prostate cancer status post radiation therapy  History of bladder cancer    Plan  Check postvoid residual  CT scan hematuria protocol  Bedside cystoscopy later today      I discussed the patient's findings and my recommendations with patient and nursing staff    Thank you for this  consult    Sami Hanna MD  08/02/24  07:08 EDT

## 2024-08-02 NOTE — PROGRESS NOTES
[facility] MEDICINE SERVICE  DAILY PROGRESS NOTE    NAME: Andrea Luna  : 1940  MRN: [id number]      LOS: 0 days     PROVIDER OF SERVICE: Kelly Rodriguez MD    Chief Complaint: CHF exacerbation    Subjective:     Interval History:    Patient evaluated at bedside.  No acute events overnight.  No complaints this morning.    Review of Systems:   Denies fevers, chills  Denies chest pain, edema, palpitations  Denies shortness of breath, cough  Denies nausea, vomiting, diarrhea  Denies dysuria, hematuria    Objective:     Vital Signs  Temp:  [97.4 °F (36.3 °C)-98.3 °F (36.8 °C)] 97.4 °F (36.3 °C)  Heart Rate:  [54-68] 54  Resp:  [12-17] 12  BP: (109-150)/(43-74) 150/69  Flow (L/min):  [2] 2   Body mass index is 21.31 kg/m².    Physical Exam  General: No acute distress, appears chronically ill  Neuro: Awake and alert, oriented x3, no focal deficits appreciated  HEENT: EOMI, moist mucus membranes  CV: RRR, no murmurs appreciated, no peripheral edema  Pulm: CTAB, no increased work of breathing  Abd: Soft, nontender, nondistended  Skin: Warm, dry and intact  Psych: Appropriate mood and affect    Scheduled Meds   aspirin, 81 mg, Oral, Daily  atorvastatin, 40 mg, Oral, Nightly  furosemide, 20 mg, Intravenous, BID  midodrine, 5 mg, Oral, BID AC  multivitamin with minerals, 1 tablet, Oral, Daily  pantoprazole, 40 mg, Oral, Q AM  potassium chloride, 40 mEq, Oral, Once  sertraline, 25 mg, Oral, Daily  sodium chloride, 10 mL, Intravenous, Q12H  traMADol, 50 mg, Oral, Nightly       PRN Meds     acetaminophen **OR** acetaminophen **OR** acetaminophen    albuterol    senna-docusate sodium **AND** polyethylene glycol **AND** bisacodyl **AND** bisacodyl    melatonin    nitroglycerin    ondansetron    sodium chloride    sodium chloride    sodium chloride   Infusions         Diagnostic Data    Results from last 7 days   Lab Units 24  0306 24  0417 24  0014   WBC 10*3/mm3 4.71  --  8.95   HEMOGLOBIN  g/dL 9.2*  --  11.4*   HEMATOCRIT % 30.1*  --  37.1*   PLATELETS 10*3/mm3 113*  --  129*   GLUCOSE mg/dL 88   < > 86   CREATININE mg/dL 1.56*   < > 1.41*   BUN mg/dL 31*   < > 22   SODIUM mmol/L 139   < > 139   POTASSIUM mmol/L 3.4*   < > 4.2   AST (SGOT) U/L  --   --  32   ALT (SGPT) U/L  --   --  10   ALK PHOS U/L  --   --  149*   BILIRUBIN mg/dL  --   --  0.7   ANION GAP mmol/L 7.3   < > 10.6    < > = values in this interval not displayed.       CT Abdomen Pelvis With & Without Contrast    Result Date: 8/2/2024  Impression: 1. Indeterminate 1.9 cm lesion at the lateral cortical margin of the right mid kidney. Given its stability and size and morphology since the CT abdomen of 1/6/2020, it would favor a benign etiology. However, on today's examination, it does appear to demonstrate contrast enhancement, which would favor malignant etiology. Any further decision as to whether to continue imaging surveillance versus soft tissue biopsy should be based upon clinical assessment for this 84-year-old patient. 2. Bilateral renal cysts. 3. Heavy calcific atherosclerosis within the bilateral renal arteries, left greater than right. Left renal artery stenosis not excluded. 4.. Mild to moderate left renal atrophy with multifocal cortical scarring. 5. Tiny nonobstructing right renal stone. 6. Moderate right pleural effusion with right basilar atelectasis. Chronic left basilar pleural effusion and chronic rounded to left basilar atelectasis. 7. Sigmoid colonic diverticulosis. Electronically Signed: Abigail William MD  8/2/2024 10:29 AM EDT  Workstation ID: [id number]    CT Head Without Contrast    Result Date: 8/1/2024  Impression: 1.No acute intracranial abnormality. 2.Stable mild to moderate chronic small vessel ischemic change and generalized parenchymal volume loss. 3.Mild paranasal sinus mucosal disease status post antrostomy. Electronically Signed: Sadiq Reza MD  8/1/2024 1:22 AM EDT  Workstation ID: [id number]    XR Chest  1 View    Result Date: 8/1/2024  Impression: Worsening moderate pulmonary edema with stable small bilateral pleural effusions. Electronically Signed: Sadiq Reza MD  8/1/2024 12:58 AM EDT  Workstation ID: [id number]     Interval results reviewed.    Assessment/Plan:     Active and Resolved Problems  Active Hospital Problems    Diagnosis  POA    **CHF exacerbation [I50.9]  Yes    Severe malnutrition [E43]  Yes    CAD (coronary artery disease) [I25.10]  Yes    Heart failure, unspecified [I50.9]  Yes    Chronic obstructive pulmonary disease, unspecified [J44.9]  Yes    Depression, unspecified [F32.A]  Yes      Resolved Hospital Problems   No resolved problems to display.     #Acute on chronic systolic heart failure  - Cardiology consulted, appreciate recs  - EF 30%, G2DD, severe AS  - Continue diuresis with lasix 20 IV bid  - Midodrine 5 bid to prevent hypotension in setting of severe AS    #Gross hematuria  #Anemia  - Urology consulted, appreciate recs  - CT w/ lesion in L kidney; will require outpatient follow up  - Cystoscopy w/ trauma and mild stricture noted; no intervention at this time  - Drop in hgb today; improvement in hematuria; will monitor with repeat CBC in AM    #Bradycardia  - Hold bb    #Elevated troponin  - 110>137; EKG without acute ST changes   - Likely secondary to demand ischemia    #CAD  #Hyperlipidemia  - s/p CABG  - Continue statin; hold asa in setting of gross hematuria w/ drop in hgb    #Depression  - Continue sertraline    VTE Prophylaxis:  Mechanical VTE prophylaxis orders are present.         Code status is   Code Status and Medical Interventions: CPR (Attempt to Resuscitate); Full Support   Ordered at: 08/01/24 0326     Code Status (Patient has no pulse and is not breathing):    CPR (Attempt to Resuscitate)     Medical Interventions (Patient has pulse or is breathing):    Full Support       Plan for disposition: [facility] in 1-2 days    Time: 30 minutes    Signature: Electronically signed by  Kelly Rodriguez MD, 08/02/24, 16:09 EDT.  [facility], [place], [facility]

## 2024-08-02 NOTE — PLAN OF CARE
Goal Outcome Evaluation:         Patient V/S have been stable. Pt is A/O x3. No complaints at this time.

## 2024-08-02 NOTE — CONSULTS
"Heart Failure Program  Nurse Navigator  Discharge Planning    Patient Name:Andrea Luna  :1940  Cardiologist:Mikel  Current Admission Date: 2024   Previous Admission:    Admission frequency:   admissions in 6 months    Heart Failure history per record:    Symptoms on admission:c/o per record low heart rate and low blood pressure. Pt denies SOA, swelling. Pt unsure why he is at the hospital      Admission Weight:  Flowsheet Rows      Flowsheet Row First Filed Value   Admission Height 170.2 cm (67\") Documented at 2024 2330   Admission Weight 57.7 kg (127 lb 3.3 oz) Documented at 2024 0447              Current Home Medications:  Prior to Admission medications    Medication Sig Start Date End Date Taking? Authorizing Provider   albuterol sulfate  (90 Base) MCG/ACT inhaler Inhale 2 puffs Every 4 (Four) Hours As Needed for Wheezing.    Abbe May MD   aspirin 81 MG chewable tablet Chew 1 tablet Daily.    Abbe May MD   atorvastatin (LIPITOR) 40 MG tablet Take 1 tablet by mouth Every Night.    Abbe May MD   carvedilol (COREG) 6.25 MG tablet Take 1 tablet by mouth 2 (Two) Times a Day With Meals.    Abbe May MD   docusate sodium (COLACE) 100 MG capsule Take 1 capsule by mouth Daily.    Abbe May MD   Ferrous Sulfate 220 (44 Fe) MG/5ML oral solution Take 5 mL by mouth Daily.    Abbe May MD   furosemide (LASIX) 20 MG tablet Take 1 tablet by mouth 2 (Two) Times a Day.    Abbe May MD   lactulose (CHRONULAC) 10 GM/15ML solution Take 30 mL by mouth 2 (Two) Times a Day As Needed.    Abbe May MD   multivitamin with minerals tablet tablet Take 1 tablet by mouth Daily.    Abbe May MD   omeprazole (priLOSEC) 20 MG capsule Take 1 capsule by mouth Daily.    Abbe May MD   ondansetron (ZOFRAN) 4 MG tablet Take 1 tablet by mouth Every 4 (Four) Hours As Needed for Nausea or Vomiting. "    Abbe May MD   polyethylene glycol (MiraLax) 17 g packet Take 17 g by mouth Daily.    Abbe May MD   potassium chloride (KLOR-CON M20) 20 MEQ CR tablet Take 1 tablet by mouth Daily.    Abbe May MD   sertraline (ZOLOFT) 25 MG tablet Take 1 tablet by mouth Daily.    Abbe May MD   traMADol (ULTRAM) 50 MG tablet Take 1 tablet by mouth Every Night.    Abbe May MD       Social history:   [facility]. No family at bedside    Smoking status:     Diagnostics Testing:  proBNP level: 80339    Echocardiogram:Results for orders placed during the hospital encounter of 07/31/24    Adult Transthoracic Echo Complete W/ Cont if Necessary Per Protocol    Interpretation Summary    Left ventricular systolic function is normal. Left ventricular ejection fraction appears to be 31 - 35%.    The left ventricular cavity is borderline dilated.    Left ventricular wall thickness is consistent with mild concentric hypertrophy.    Left ventricular diastolic function is consistent with (grade II w/high LAP) pseudonormalization.    Mildly reduced right ventricular systolic function noted.    The right ventricular cavity is borderline dilated.    The left atrial cavity is severely dilated.    Left atrial volume is severely increased.    The right atrial cavity is severely  dilated.    Severe aortic valve stenosis is present.    Abnormal mitral valve structure consistent with dilated annulus.    Moderate to severe mitral valve regurgitation is present with an eccentric jet noted.    Estimated right ventricular systolic pressure from tricuspid regurgitation is mildly elevated (35-45 mmHg).    Mild pulmonary hypertension is present.        Patient Assessment:   Pt lying in bed, resp even and unlabored. No soa with conversation, pt alert and orient x2, confused on place and reason at hospital. No edema    Current O2:   Home O2:      Education provided to patient:  yes- Heart  Failure disease education  yes -Symptom identification/management  pending -Daily Weights  pending- Diet education  na- Fluid restriction (if ordered)  pending- Activity education  pending- Medication education  na- Smoking cessation   - Follow-up Appointments   -Provided information on how to access [facility] My HF Guide/Heart Failure Interactive workbook    Acceptance of learning: acceptance, cooperative    Heart Failure education interactive teaching session time: 15 minutes    GWT-35%    Identified needs/barriers:   Volume status, needs 7 day follow-up and cardiology follow-up    Intervention:   Education attempt

## 2024-08-02 NOTE — THERAPY EVALUATION
Patient Name: Andrea Luna  : 1940    MRN: [id number]                              Today's Date: 2024       Admit Date: 2024    Visit Dx:     ICD-10-CM ICD-9-CM   1. Altered mental status, unspecified altered mental status type  R41.82 780.97   2. Acute on chronic congestive heart failure, unspecified heart failure type  I50.9 428.0     Patient Active Problem List   Diagnosis    CHF exacerbation    CAD (coronary artery disease)    Depression, unspecified    Heart failure, unspecified    Chronic obstructive pulmonary disease, unspecified     Past Medical History:   Diagnosis Date    SRIKANTH (acute kidney injury)     Aortic valve stenosis     Cervical spinal stenosis     CHF (congestive heart failure)     Coronary artery disease     DDD (degenerative disc disease), cervical     Elevated cholesterol     Hyperlipidemia     Hypertension     Pneumonia      Past Surgical History:   Procedure Laterality Date    CORONARY ARTERY BYPASS GRAFT        General Information       Row Name 2428          Physical Therapy Time and Intention    Document Type evaluation  -     Mode of Treatment physical therapy  -       Row Name 2428          General Information    Prior Level of Function independent:;all household mobility;bed mobility;dressing  uses rollator; one fall in the past six months; meals brought to room. Wheelchair outside of room. Pt doesn't remember how many liters of O2  -DMITRI     Existing Precautions/Restrictions fall;oxygen therapy device and L/min  -DMITRI     Barriers to Rehab medically complex;previous functional deficit  -       Row Name 2428          Living Environment    People in Home facility resident  -       Row Name 2428          Home Main Entrance    Number of Stairs, Main Entrance none  -       Row Name 2428          Stairs Within Home, Primary    Number of Stairs, Within Home, Primary none  -       Row Name 2428          Cognition     Orientation Status (Cognition) oriented to;person;place;time;disoriented to;situation  -       Row Name 08/02/24 0828          Safety Issues, Functional Mobility    Safety Issues Affecting Function (Mobility) safety precautions follow-through/compliance;insight into deficits/self-awareness;positioning of assistive device  -     Impairments Affecting Function (Mobility) endurance/activity tolerance;balance  -               User Key  (r) = Recorded By, (t) = Taken By, (c) = Cosigned By      Initials Name Provider Type    Kavitha Kulkarni PT Physical Therapist                   Mobility       Row Name 08/02/24 1055          Bed Mobility    Bed Mobility supine-sit  -     Supine-Sit [place] (Bed Mobility) standby assist  -     Comment, (Bed Mobility) increased time to complete  -       Row Name 08/02/24 1055          Sit-Stand Transfer    Sit-Stand [place] (Transfers) contact guard;minimum assist (75% patient effort)  -     Assistive Device (Sit-Stand Transfers) walker, front-wheeled  -DMITRI     Comment, (Sit-Stand Transfer) STS from EOB; pt stands and utilizes urinal with CGA and minimal postural sway; poor eccentric control when returning to a seated position requiring min A for safety  -       Row Name 08/02/24 1055          Gait/Stairs (Locomotion)    [place] Level (Gait) contact guard  -     Assistive Device (Gait) walker, front-wheeled  -DMITRI     Patient was able to Ambulate yes  -DMITRI     Distance in Feet (Gait) 40  -DMITRI     Deviations/Abnormal Patterns (Gait) gait speed decreased;festinating/shuffling;stride length decreased;base of support, wide  -DMITRI     Comment, (Gait/Stairs) Pt with wide GIOVANNY and step to gait at times. Increased time on turns, CGA for safety.  -               User Key  (r) = Recorded By, (t) = Taken By, (c) = Cosigned By      Initials Name Provider Type    Kavitha Kulkarni PT Physical Therapist                   Obj/Interventions       Row Name 08/02/24 1102           Range of Motion Comprehensive    General Range of Motion bilateral lower extremity ROM WFL  -[facility] Name 08/02/24 1106          Strength Comprehensive (MMT)    Comment, General Manual Muscle Testing (MMT) Assessment BLE grossly 4-/5  -[facility] Name 08/02/24 1106          Balance    Balance Assessment sitting static balance;sitting dynamic balance;standing static balance;standing dynamic balance  -     Static Sitting Balance supervision  -     Dynamic Sitting Balance supervision  -     Position, Sitting Balance sitting edge of bed  -     Static Standing Balance standby assist  -     Dynamic Standing Balance contact guard  -     Position/Device Used, Standing Balance walker, rolling  -[facility] Name 08/02/24 1106          Sensory Assessment (Somatosensory)    Sensory Assessment (Somatosensory) sensation intact  -               User Key  (r) = Recorded By, (t) = Taken By, (c) = Cosigned By      Initials Name Provider Type    Kavitha Kulkarni, PT Physical Therapist                   Goals/Plan       Row Name 08/02/24 1113          Bed Mobility Goal 1 (PT)    Activity/Assistive Device (Bed Mobility Goal 1, PT) bed mobility activities, all  -DMITRI     [place] Level/Cues Needed (Bed Mobility Goal 1, PT) supervision required  -DMITRI     Time Frame (Bed Mobility Goal 1, PT) long term goal (LTG);2 weeks  -DMITRI       Row Name 08/02/24 1113          Transfer Goal 1 (PT)    Activity/Assistive Device (Transfer Goal 1, PT) sit-to-stand/stand-to-sit;bed-to-chair/chair-to-bed  -DMITRI     [place] Level/Cues Needed (Transfer Goal 1, PT) supervision required  -DMITRI     Time Frame (Transfer Goal 1, PT) long term goal (LTG);2 weeks  -DMITRI       Row Name 08/02/24 1113          Gait Training Goal 1 (PT)    Activity/Assistive Device (Gait Training Goal 1, PT) gait (walking locomotion)  -     [place] Level (Gait Training Goal 1, PT) supervision required  -     Distance (Gait Training Goal 1, PT) 75ft   -     Time Frame (Gait Training Goal 1, PT) long term goal (LTG);2 weeks  -       Row Name 08/02/24 1113          Therapy Assessment/Plan (PT)    Planned Therapy Interventions (PT) balance training;bed mobility training;gait training;home exercise program;patient/family education;strengthening;neuromuscular re-education;transfer training  -               User Key  (r) = Recorded By, (t) = Taken By, (c) = Cosigned By      Initials Name Provider Type    Kavitha Kulkarni, PT Physical Therapist                   Clinical Impression       Row Name 08/02/24 1106          Pain    Pretreatment Pain Rating 0/10 - no pain  -     Posttreatment Pain Rating 0/10 - no pain  -       Row Name 08/02/24 1106          Plan of Care Review    Plan of Care Reviewed With patient  -     Outcome Evaluation Pt is an 83 y/o male who presents to [facility] for being in and out of responsiveness. He was found to be hypotensive, bradycardia, AMS, and elevated troponin. Pt in CHF exacerbation. Pt is A/O person, place, and time. He is typically independent with household ambulation using a rollator. At this time pt requires SBA for bed mobility, CGA-min A for transfers due to poor eccentric control, and CGA for short distance ambulation using RW. Pt presents with poor gait mechanics requiring increased time with turns and poor safety awareness. He will need SNF at discharge as he remains a high risk for falls and unsafe to ambulate without assist.  -       Row Name 08/02/24 1106          Therapy Assessment/Plan (PT)    Rehab Potential (PT) good, to achieve stated therapy goals  -     Criteria for Skilled Interventions Met (PT) yes;meets criteria  -     Therapy Frequency (PT) 5 times/wk  -     Predicted Duration of Therapy Intervention (PT) Until d/c  -       Row Name 08/02/24 1106          Vital Signs    Pre Systolic BP Rehab 112  WNL  -     Pre Treatment Diastolic BP 76  -DMITRI     Pretreatment Heart Rate (beats/min) 58  -DMITRI      Intratreatment Heart Rate (beats/min) 81  -DMITRI     Posttreatment Heart Rate (beats/min) 68  -DMITRI     O2 Delivery Pre Treatment supplemental O2  4L O2  -DMITRI     O2 Delivery Intra Treatment supplemental O2  -DMITRI     O2 Delivery Post Treatment supplemental O2  -DMITRI       Row Name 08/02/24 1106          Positioning and Restraints    Pre-Treatment Position in bed  -DMITRI     Post Treatment Position chair  -DMITRI     In Chair notified nsg;sitting;call light within reach;encouraged to call for assist;exit alarm on  -DMITRI               User Key  (r) = Recorded By, (t) = Taken By, (c) = Cosigned By      Initials Name Provider Type    Kavitha Kulkarni PT Physical Therapist                   Outcome Measures       Row Name 08/02/24 1113 08/02/24 0828       How much help from another person do you currently need...    Turning from your back to your side while in flat bed without using bedrails? 3  -DMITRI 3  -KU    Moving from lying on back to sitting on the side of a flat bed without bedrails? 3  -DMITRI 3  -KU    Moving to and from a bed to a chair (including a wheelchair)? 3  -DMITRI 3  -KU    Standing up from a chair using your arms (e.g., wheelchair, bedside chair)? 3  -DMITRI 3  -KU    Climbing 3-5 steps with a railing? 2  -DMITRI 2  -KU    To walk in hospital room? 3  -DMITRI 2  -KU    AM-PAC 6 Clicks Score (PT) 17  -DMITRI 16  -KU    Highest Level of Mobility Goal 5 --> Static standing  -DMITRI 5 --> Static standing  -KU      Row Name 08/02/24 1113          Functional Assessment    Outcome Measure Options AM-PAC 6 Clicks Basic Mobility (PT)  -DMITRI               User Key  (r) = Recorded By, (t) = Taken By, (c) = Cosigned By      Initials Name Provider Type    Ambar Rivero RN Registered Nurse    Kavitha Kulkarni PT Physical Therapist                                 Physical Therapy Education       Title: PT OT SLP Therapies (In Progress)       Topic: Physical Therapy (In Progress)       Point: Mobility training (In Progress)       Learning Progress  Summary             Patient Acceptance, E,TB, NR by  at 8/2/2024 1114                         Point: Home exercise program (Not Started)       Learner Progress:  Not documented in this visit.              Point: Body mechanics (In Progress)       Learning Progress Summary             Patient Acceptance, E,TB, NR by  at 8/2/2024 1114                         Point: Precautions (In Progress)       Learning Progress Summary             Patient Acceptance, E,TB, NR by  at 8/2/2024 1114                                         User Key       Initials Effective Dates Name Provider Type Discipline     08/23/21 -  Kavitha Paniagua, PT Physical Therapist PT                  PT Recommendation and Plan  Planned Therapy Interventions (PT): balance training, bed mobility training, gait training, home exercise program, patient/family education, strengthening, neuromuscular re-education, transfer training  Plan of Care Reviewed With: patient  Outcome Evaluation: Pt is an 83 y/o male who presents to [facility] for being in and out of responsiveness. He was found to be hypotensive, bradycardia, AMS, and elevated troponin. Pt in CHF exacerbation. Pt is A/O person, place, and time. He is typically independent with household ambulation using a rollator. At this time pt requires SBA for bed mobility, CGA-min A for transfers due to poor eccentric control, and CGA for short distance ambulation using RW. Pt presents with poor gait mechanics requiring increased time with turns and poor safety awareness. He will need SNF at discharge as he remains a high risk for falls and unsafe to ambulate without assist.     Time Calculation:         PT Charges       Row Name 08/02/24 1137             Time Calculation    Start Time 0819  -      Stop Time 0856  -      Time Calculation (min) 37 min  -      PT Received On 08/02/24  -DMITRI      PT - Next Appointment 08/04/24  -DMITRI      PT Goal Re-Cert Due Date 08/16/24  -         Time Calculation- PT     Total Timed Code Minutes- PT --  -DMITRI                User Key  (r) = Recorded By, (t) = Taken By, (c) = Cosigned By      Initials Name Provider Type    Kavitha Kulkarni, PT Physical Therapist                  Therapy Charges for Today       Code Description Service Date Service Provider Modifiers Qty    [id number] HC PT EVAL MOD COMPLEXITY 4 8/2/2024 Kavitha Paniagua, PT GP 1            PT G-Codes  Outcome Measure Options: AM-PAC 6 Clicks Basic Mobility (PT)  AM-PAC 6 Clicks Score (PT): 17  PT Discharge Summary  Anticipated Discharge Disposition (PT): skilled nursing facility    Kavitha Paniagua PT  8/2/2024

## 2024-08-02 NOTE — PLAN OF CARE
Goal Outcome Evaluation:  Plan of Care Reviewed With: patient        Progress: no change  Outcome Evaluation: Pt is an 83 y/o male who presents to [facility] for being in and out of responsiveness. He was found to be hypotensive, bradycardia, AMS, and elevated troponin. Pt in CHF exacerbation. Pt. admit from [facility], maintains ADL independence at baseline. Pt. oriented to self and place this date, requires mod A for bed mobility w/ severe posteior lean. Pt. attempts to toilet at EOB w/ max A to uitlize urinal and manage brief, blood in urine and nsg notified. Pt. requires mod A sit to stand transfer. Not safe to d/c home at this time and will require SNF placement at d/c to address aforementioned deficits.      Anticipated Discharge Disposition (OT): skilled nursing facility

## 2024-08-02 NOTE — CASE MANAGEMENT/SOCIAL WORK
Continued Stay Note  PAULA Clark     Patient Name: Andrea Luna  MRN: [id number]  Today's Date: 8/2/2024    Admit Date: 7/31/2024    Plan: Return to [facility] [place]. Current with [facility]- will need CHARLEE order at discharge.   Discharge Plan       Row Name 08/02/24 1521       Plan    Plan Return to [facility] [place]. Current with [facility]- will need CHARLEE order at discharge.    Plan Comments CM spoke with daughter/ DEBRA Garcia via phone to discuss discharge needs. Plan is for pt to return to [facility] at discharge (CM called [facility] and spoke with Tono Stone LPN who stated that pt can return as long as he does not have IV medications. Dtr Radha updated. Dtr states pt is current with [facility]- liaison contacted to verify. Will need HHC order at discharge. DC barriers: elevated troponins, elevated BUN/ Creat, CT abd- possible malignancy, cysto today at bedside. Urology, cardiology and heart failure navigator following                 Alison Toney RN      Office phone: [phone]  Office fax: [phone]

## 2024-08-02 NOTE — POST-PROCEDURE NOTE
OP Note  Preop Dx: Gross hematuria  Postop Dx: Dx same  Procedure: Bedside cystoscopy  Surgeon: Sami Hanna MD  Anesthesia:  Local  Complications: None  Findings: EBL 0.  Trauma seen in the bulbar region as well as a mild stricture     Description of procedure:  Timeout performed at bedside.  Patient was prepped and draped in sterile fashion.  Flexible cystoscope advanced under visual guidance.  Urethra showed some trauma in the area of the bulbar region though there was no current bleeding.  There was some tightness with passing the scope through this area but it passed through rather easily.  The prostate has been resected in the past.  There is some scarring with a moderate bladder neck contracture.  Bladder mucosa reveals 1+ trabeculation throughout but no evidence for any tumor, stone, foreign body.  Both ureteral orifice visualized and are normal.  There is no blood in the bladder itself.  The cystoscope was removed.  The patient tolerated the procedure well without any complications.    CT scan did not show any definite abnormalities though there is a lesion in the left kidney that is questionable for enhancement but has not changed in several years.  This will require monitoring.    Nothing further to add from urology standpoint so I will sign off.  Patient to follow-up with me in approximately 1 month and we will arrange for further monitoring of his renal lesion.

## 2024-08-02 NOTE — DISCHARGE PLACEMENT REQUEST
"Helena Luna (84 y.o. Male)       Date of Birth   1940    Social Security Number       Address   [address] [place] [facility]    Home Phone   [phone]    MRN   [id number]       Taoist   Mandaen    Marital Status                               Admission Date   7/31/24    Admission Type   Emergency    Admitting Provider   Fani Perera MD    Attending Provider   Kelly Rodriguez MD    Department, Room/Bed   [facility] 3C MEDICAL INPATIENT, 376/1       Discharge Date       Discharge Disposition       Discharge Destination                                 Attending Provider: Kelly Rodriguez MD    Allergies: Codeine, Latex, Sulfa Antibiotics    Isolation: None   Infection: None   Code Status: CPR    Ht: 167.6 cm (66\")   Wt: 59.9 kg (132 lb 0.9 oz)    Admission Cmt: None   Principal Problem: CHF exacerbation [I50.9]                   Active Insurance as of 7/31/2024       Primary Coverage       Payor Plan Insurance Group Employer/Plan Group    MEDICARE MEDICARE A & B        Payor Plan Address Payor Plan Phone Number Payor Plan Fax Number Effective Dates    [address] [phone]  4/1/2004 - None Entered    [facility] [id number]         Subscriber Name Subscriber Birth Date Member ID       HELENA LUNA 1940 [id number]               Secondary Coverage       Payor Plan Insurance Group Employer/Plan Group    [employer] SUP   [facility] SUP                     Coverage Address Coverage Phone Number Coverage Fax Number Effective Dates    [address] [phone]  1/1/2013 - None Entered    [facility] [id number]         Subscriber Name Subscriber Birth Date Member ID       HELENA LUNA [id number]                     Emergency Contacts        (Rel.) Home Phone Work Phone Mobile Phone    DELMA MARTINEZ(POA) (Other) -- -- [phone]                "

## 2024-08-03 LAB
ANION GAP SERPL CALCULATED.3IONS-SCNC: 7 MMOL/L (ref 5–15)
BASOPHILS # BLD AUTO: 0.02 10*3/MM3 (ref 0–0.2)
BASOPHILS NFR BLD AUTO: 0.5 % (ref 0–1.5)
BUN SERPL-MCNC: 30 MG/DL (ref 8–23)
BUN/CREAT SERPL: 20.5 (ref 7–25)
CALCIUM SPEC-SCNC: 8.8 MG/DL (ref 8.6–10.5)
CHLORIDE SERPL-SCNC: 104 MMOL/L (ref 98–107)
CO2 SERPL-SCNC: 31 MMOL/L (ref 22–29)
CREAT SERPL-MCNC: 1.46 MG/DL (ref 0.76–1.27)
DEPRECATED RDW RBC AUTO: 56.3 FL (ref 37–54)
EGFRCR SERPLBLD CKD-EPI 2021: 46.8 ML/MIN/1.73
EOSINOPHIL # BLD AUTO: 0.18 10*3/MM3 (ref 0–0.4)
EOSINOPHIL NFR BLD AUTO: 4.1 % (ref 0.3–6.2)
ERYTHROCYTE [DISTWIDTH] IN BLOOD BY AUTOMATED COUNT: 15.8 % (ref 12.3–15.4)
GLUCOSE SERPL-MCNC: 86 MG/DL (ref 65–99)
HCT VFR BLD AUTO: 32.3 % (ref 37.5–51)
HGB BLD-MCNC: 9.7 G/DL (ref 13–17.7)
IMM GRANULOCYTES # BLD AUTO: 0.01 10*3/MM3 (ref 0–0.05)
IMM GRANULOCYTES NFR BLD AUTO: 0.2 % (ref 0–0.5)
LYMPHOCYTES # BLD AUTO: 0.69 10*3/MM3 (ref 0.7–3.1)
LYMPHOCYTES NFR BLD AUTO: 15.8 % (ref 19.6–45.3)
MCH RBC QN AUTO: 29.4 PG (ref 26.6–33)
MCHC RBC AUTO-ENTMCNC: 30 G/DL (ref 31.5–35.7)
MCV RBC AUTO: 97.9 FL (ref 79–97)
MONOCYTES # BLD AUTO: 0.32 10*3/MM3 (ref 0.1–0.9)
MONOCYTES NFR BLD AUTO: 7.3 % (ref 5–12)
NEUTROPHILS NFR BLD AUTO: 3.15 10*3/MM3 (ref 1.7–7)
NEUTROPHILS NFR BLD AUTO: 72.1 % (ref 42.7–76)
NRBC BLD AUTO-RTO: 0 /100 WBC (ref 0–0.2)
PLATELET # BLD AUTO: 131 10*3/MM3 (ref 140–450)
PMV BLD AUTO: 10.1 FL (ref 6–12)
POTASSIUM SERPL-SCNC: 3.4 MMOL/L (ref 3.5–5.2)
RBC # BLD AUTO: 3.3 10*6/MM3 (ref 4.14–5.8)
SODIUM SERPL-SCNC: 142 MMOL/L (ref 136–145)
WBC NRBC COR # BLD AUTO: 4.37 10*3/MM3 (ref 3.4–10.8)

## 2024-08-03 PROCEDURE — 25010000002 FUROSEMIDE PER 20 MG: Performed by: INTERNAL MEDICINE

## 2024-08-03 PROCEDURE — 85025 COMPLETE CBC W/AUTO DIFF WBC: CPT | Performed by: STUDENT IN AN ORGANIZED HEALTH CARE EDUCATION/TRAINING PROGRAM

## 2024-08-03 PROCEDURE — 80048 BASIC METABOLIC PNL TOTAL CA: CPT

## 2024-08-03 PROCEDURE — 99233 SBSQ HOSP IP/OBS HIGH 50: CPT | Performed by: INTERNAL MEDICINE

## 2024-08-03 RX ORDER — POTASSIUM CHLORIDE 20 MEQ/1
40 TABLET, EXTENDED RELEASE ORAL ONCE
Status: COMPLETED | OUTPATIENT
Start: 2024-08-03 | End: 2024-08-03

## 2024-08-03 RX ADMIN — POTASSIUM CHLORIDE 40 MEQ: 1500 TABLET, EXTENDED RELEASE ORAL at 09:54

## 2024-08-03 RX ADMIN — Medication 1 TABLET: at 08:39

## 2024-08-03 RX ADMIN — Medication 10 ML: at 20:49

## 2024-08-03 RX ADMIN — Medication 10 ML: at 08:39

## 2024-08-03 RX ADMIN — PANTOPRAZOLE SODIUM 40 MG: 40 TABLET, DELAYED RELEASE ORAL at 06:21

## 2024-08-03 RX ADMIN — SERTRALINE HYDROCHLORIDE 25 MG: 25 TABLET ORAL at 08:39

## 2024-08-03 RX ADMIN — FUROSEMIDE 20 MG: 10 INJECTION, SOLUTION INTRAMUSCULAR; INTRAVENOUS at 17:21

## 2024-08-03 RX ADMIN — ATORVASTATIN CALCIUM 40 MG: 40 TABLET, FILM COATED ORAL at 20:21

## 2024-08-03 RX ADMIN — FUROSEMIDE 20 MG: 10 INJECTION, SOLUTION INTRAMUSCULAR; INTRAVENOUS at 08:39

## 2024-08-03 RX ADMIN — MIDODRINE HYDROCHLORIDE 5 MG: 5 TABLET ORAL at 08:39

## 2024-08-03 RX ADMIN — TRAMADOL HYDROCHLORIDE 50 MG: 50 TABLET ORAL at 20:21

## 2024-08-03 NOTE — PROGRESS NOTES
Cardiology Progress Note      PATIENT IDENTIFICATION    Name: Andrea Luna  Age: 84 y.o. Sex: male : 1940  MRN: [id number]    Requesting Provider    Kelly Rodriguez MD     LOS: 0 days       Reason For Followup:  Heart failure reduced ejection fraction  Aortic stenosis  Elevated troponin  Coronary artery disease      Subjective:    Interval History:  Seen and examined.  Chart and labs reviewed.  Patient denies any chest pain pressure heaviness tightness.  No complaints today.  Patient is net 2495 negative length of stay    Review of Systems:  A complete review of systems was undertaken with the pertinent cardiovascular findings listed in history of present illness and all other systems being negative.     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  Bradycardia    Recommendations:  Continue diuresis as renal function allows  Monitor renal function and electrolytes with IV diuresis to observe for toxicities  Monitor rate and rhythm  Avoid beta-blocker secondary to bradycardia  Patient will need to be plugged back into the [facility] system at discharge where he is TAVR workup is in progress        Objective:    Medication Review:   Scheduled Meds:[Held by provider] aspirin, 81 mg, Oral, Daily  atorvastatin, 40 mg, Oral, Nightly  furosemide, 20 mg, Intravenous, BID  multivitamin with minerals, 1 tablet, Oral, Daily  pantoprazole, 40 mg, Oral, Q AM  sertraline, 25 mg, Oral, Daily  sodium chloride, 10 mL, Intravenous, Q12H  traMADol, 50 mg, Oral, Nightly      Continuous Infusions:   PRN Meds:.  acetaminophen **OR** acetaminophen **OR** acetaminophen    albuterol    senna-docusate sodium **AND** polyethylene glycol **AND** bisacodyl **AND** bisacodyl    melatonin    nitroglycerin    ondansetron     sodium chloride    sodium chloride    sodium chloride      CHF exacerbation    CAD (coronary artery disease)    Depression, unspecified    Heart failure, unspecified    Chronic obstructive pulmonary disease, unspecified    Severe malnutrition         Physical Exam:    General: Alert, cooperative, no distress, appears stated age.  Frail  Head:  Normocephalic, atraumatic, mucous membranes moist  Eyes:  Conjunctivae/corneas clear, EOMs intact     Neck:  Supple,  no bruit  Lungs:  Clear to auscultation bilaterally, no wheezes, rhonchi or rales are noted  Chest wall: No tenderness  Heart::  Regular rate and rhythm, S1 and S2 normal, 3/6 late peaking systolic ejection murmur.  No rub or gallop  Abdomen: Soft, nontender, nondistended, bowel sounds active  Extremities: No cyanosis, clubbing.  Edema noted  Pulses: Diminished pedal pulses  Skin:  No rashes or lesions  Neuro/psych: A&O x3. CN II through XII are grossly intact with appropriate affect    Vital Signs:  Vitals:    08/02/24 1726 08/02/24 1901 08/02/24 2100 08/03/24 0321   BP: 169/88 156/84  156/70   BP Location:  Left arm  Left arm   Patient Position:  Lying  Lying   Pulse: 63 73  66   Resp:  17  17   Temp:  97.8 °F (36.6 °C)  97.9 °F (36.6 °C)   TempSrc:  Oral  Oral   SpO2:  99% 100% 97%   Weight:       Height:         Wt Readings from Last 1 Encounters:   08/01/24 59.9 kg (132 lb 0.9 oz)       Intake/Output Summary (Last 24 hours) at 8/3/2024 1007  Last data filed at 8/3/2024 1000  Gross per 24 hour   Intake 800 ml   Output 2145 ml   Net -1345 ml         Results Review:     CBC    Results from last 7 days   Lab Units 08/03/24  0443 08/02/24  0306 08/01/24  0014   WBC 10*3/mm3 4.37 4.71 8.95   HEMOGLOBIN g/dL 9.7* 9.2* 11.4*   PLATELETS 10*3/mm3 131* 113* 129*     Cr Clearance Estimated Creatinine Clearance: 31.3 mL/min (A) (by C-G formula based on SCr of 1.46 mg/dL (H)).  Coag     HbA1C   Lab Results   Component Value Date    HGBA1C 5.5 06/01/2021     Blood  "Glucose No results found for: \"POCGLU\"  Infection   Results from last 7 days   Lab Units 08/01/24  0147 08/01/24  0014   BLOODCX  No growth at 2 days No growth at 2 days     CMP   Results from last 7 days   Lab Units 08/03/24 0443 08/02/24  0306 08/01/24 0417 08/01/24  0014   SODIUM mmol/L 142 139 141 139   POTASSIUM mmol/L 3.4* 3.4* 3.7 4.2   CHLORIDE mmol/L 104 103 106 104   CO2 mmol/L 31.0* 28.7 25.5 24.4   BUN mg/dL 30* 31* 22 22   CREATININE mg/dL 1.46* 1.56* 1.26 1.41*   GLUCOSE mg/dL 86 88 85 86   ALBUMIN g/dL  --   --   --  3.5   BILIRUBIN mg/dL  --   --   --  0.7   ALK PHOS U/L  --   --   --  149*   AST (SGOT) U/L  --   --   --  32   ALT (SGPT) U/L  --   --   --  10     ABG    Results from last 7 days   Lab Units 08/01/24  0014   PH, ARTERIAL pH units 7.339*   PCO2, ARTERIAL mm Hg 48.9*   PO2 ART mm Hg 103.4   O2 SATURATION ART % 97.5   BASE EXCESS ART mmol/L 0.2     UA    Results from last 7 days   Lab Units 08/01/24  0207   NITRITE UA  Negative   WBC UA /HPF 3-5*   BACTERIA UA /HPF None Seen   SQUAM EPITHEL UA /HPF 3-6*     BALJEET  No results found for: \"POCMETH\", \"POCAMPHET\", \"POCBARBITUR\", \"POCBENZO\", \"POCCOCAINE\", \"POCOPIATES\", \"POCOXYCODO\", \"POCPHENCYC\", \"POCPROPOXY\", \"POCTHC\", \"POCTRICYC\"  Lysis Labs   Results from last 7 days   Lab Units 08/03/24 0443 08/02/24  0306 08/01/24 0417 08/01/24  0014   HEMOGLOBIN g/dL 9.7* 9.2*  --  11.4*   PLATELETS 10*3/mm3 131* 113*  --  129*   CREATININE mg/dL 1.46* 1.56* 1.26 1.41*     Radiology(recent) CT Abdomen Pelvis With & Without Contrast    Result Date: 8/2/2024  Impression: 1. Indeterminate 1.9 cm lesion at the lateral cortical margin of the right mid kidney. Given its stability and size and morphology since the CT abdomen of 1/6/2020, it would favor a benign etiology. However, on today's examination, it does appear to demonstrate contrast enhancement, which would favor malignant etiology. Any further decision as to whether to continue imaging surveillance " versus soft tissue biopsy should be based upon clinical assessment for this 84-year-old patient. 2. Bilateral renal cysts. 3. Heavy calcific atherosclerosis within the bilateral renal arteries, left greater than right. Left renal artery stenosis not excluded. 4.. Mild to moderate left renal atrophy with multifocal cortical scarring. 5. Tiny nonobstructing right renal stone. 6. Moderate right pleural effusion with right basilar atelectasis. Chronic left basilar pleural effusion and chronic rounded to left basilar atelectasis. 7. Sigmoid colonic diverticulosis. Electronically Signed: Abigail William MD  8/2/2024 10:29 AM EDT  Workstation ID: [id number]       Results from last 7 days   Lab Units 08/01/24  0147   HSTROP T ng/L 137*       Imaging Results (Last 24 Hours)       Procedure Component Value Units Date/Time    CT Abdomen Pelvis With & Without Contrast [[id number]] Collected: 08/02/24 1018     Updated: 08/02/24 1031    Narrative:      CT ABDOMEN PELVIS W WO CONTRAST    Date of Exam: 8/2/2024 9:57 AM EDT    Indication: S hematuria.    Comparison: Renal ultrasound 4/20/2024. CT abdomen and pelvis with contrast 1/6/2020.    Technique: Axial CT images were obtained of the abdomen and pelvis before and after the uneventful intravenous administration of iodinated contrast. Sagittal and coronal reconstructions were performed.  Automated exposure control and iterative   reconstruction methods were used.      Findings:  No left renal stone is seen. Benign left renal hilar vascular calcification and benign left renal cortical calcifications are present. There is a punctate nonobstructing stone within the right mid kidney. No ureteral stone is identified.    A 1.9 cm hyperdense lesion is seen in the lateral cortex of the right mid kidney (noncontrast Hounsfield unit 73.6). It appears to demonstrate contrast enhancement (corticomedullary phase postcontrast Hounsfield units 89.6, delayed 5-minute phase of   postcontrast  Hounsfield units of 88.6), elevating index of suspicion for potential malignancy. However, the 1/6/2020 CT abdomen demonstrates a similar size and shape hyperdense lesion in the same location.    Simple bilateral renal cysts are present, largest at the lower pole measuring 3.7 cm. There is multifocal left renal cortical scarring and mild to moderate left renal atrophy.    The urinary bladder is within normal limits.    The liver, spleen, adrenals are within normal limits. Uncomplicated cholelithiasis. Heavy calcific atherosclerosis within the abdominal aorta, proximal bilateral renal arteries. Pancreas is mildly atrophic with signs of chronic calcific pancreatitis.    The stomach, and the large and small bowel, do not appear abnormally thickened, dilated or inflamed. Advanced uncomplicated diverticular changes are present of the sigmoid colon. There is small pelvic ascites. There is generalized body wall edema.    Moderate right and small left pleural effusions are present. The left pleural effusion appears chronic, loculated, with wall calcification. There is chronic rounded atelectasis within the left lower lobe. There is mild atelectasis in the right middle   lobe and right lower lobe.    Heart size is moderately enlarged. Coronary artery calcifications are present. Median sternotomy changes are seen. There is a mild aortic valve calcification.    Right hip replacement changes are noted. Lumbar levoscoliosis. Degenerative disc endplate changes and facet arthropathy within the spine. No acute or suspicious osseous abnormalities.        Impression:      Impression:    1. Indeterminate 1.9 cm lesion at the lateral cortical margin of the right mid kidney. Given its stability and size and morphology since the CT abdomen of 1/6/2020, it would favor a benign etiology. However, on today's examination, it does appear to   demonstrate contrast enhancement, which would favor malignant etiology. Any further decision as to  whether to continue imaging surveillance versus soft tissue biopsy should be based upon clinical assessment for this 84-year-old patient.  2. Bilateral renal cysts.  3. Heavy calcific atherosclerosis within the bilateral renal arteries, left greater than right. Left renal artery stenosis not excluded.  4.. Mild to moderate left renal atrophy with multifocal cortical scarring.  5. Tiny nonobstructing right renal stone.  6. Moderate right pleural effusion with right basilar atelectasis. Chronic left basilar pleural effusion and chronic rounded to left basilar atelectasis.  7. Sigmoid colonic diverticulosis.        Electronically Signed: Abigail William MD    8/2/2024 10:29 AM EDT    Workstation ID: [id number]            Cardiac Studies:  Echo- Results for orders placed during the hospital encounter of 07/31/24    Adult Transthoracic Echo Complete W/ Cont if Necessary Per Protocol    Interpretation Summary    Left ventricular systolic function is normal. Left ventricular ejection fraction appears to be 31 - 35%.    The left ventricular cavity is borderline dilated.    Left ventricular wall thickness is consistent with mild concentric hypertrophy.    Left ventricular diastolic function is consistent with (grade II w/high LAP) pseudonormalization.    Mildly reduced right ventricular systolic function noted.    The right ventricular cavity is borderline dilated.    The left atrial cavity is severely dilated.    Left atrial volume is severely increased.    The right atrial cavity is severely  dilated.    Severe aortic valve stenosis is present.    Abnormal mitral valve structure consistent with dilated annulus.    Moderate to severe mitral valve regurgitation is present with an eccentric jet noted.    Estimated right ventricular systolic pressure from tricuspid regurgitation is mildly elevated (35-45 mmHg).    Mild pulmonary hypertension is present.    Stress Myoview-  Cath-        Ezio Irizarry DO  08/03/24  10:07  EDT    Copied information has been reviewed and is current as of 08/03/24

## 2024-08-03 NOTE — PLAN OF CARE
Goal Outcome Evaluation:            Patient vitals stable. Slept most of the shift. A/Ox3

## 2024-08-03 NOTE — NURSING NOTE
Daily Care Plan Summary: Heart Failure    Diuretic in use (IV or PO):   IV        Daily weight (up or down):    loss: 7lbs      Output > Intake (yes/no):Yes      O2 Requirements (current, any change?):  2 liters/min via nasal cannula      Symptoms noted with Activity (Respiratory Tolerance, functional state):     Tolerates activity well without shortness of breath.      Anticipated Discharge Plans:     return to [facility] when medically stable

## 2024-08-03 NOTE — PLAN OF CARE
Goal Outcome Evaluation:              Outcome Evaluation: Pt has been resting comfortably during the shift. Pt reamins on 2L NC which is baseline. He is alert and oreinted. Pt ambulated the victoria with a walker and assistance and also transferred to a chair with assistance. He tolerated the activities well. He has has no complaints and is stable at this time.

## 2024-08-03 NOTE — PROGRESS NOTES
[facility] MEDICINE SERVICE  DAILY PROGRESS NOTE    NAME: Andrea Luna  : 1940  MRN: [id number]      LOS: 0 days     PROVIDER OF SERVICE: Kelly Rodriguez MD    Chief Complaint: CHF exacerbation    Subjective:     Interval History:    Patient without complaint today. Endorsing good urine output on diuretics.    Review of Systems:   Denies fevers, chills  Denies chest pain, edema, palpitations  Denies shortness of breath, cough  Denies nausea, vomiting, diarrhea  Denies dysuria, hematuria    Objective:     Vital Signs  Temp:  [97.8 °F (36.6 °C)-97.9 °F (36.6 °C)] 97.9 °F (36.6 °C)  Heart Rate:  [63-73] 66  Resp:  [17] 17  BP: (156-169)/(70-88) 156/70  Flow (L/min):  [2-3] 2   Body mass index is 21.31 kg/m².    Physical Exam  General: No acute distress, appears chronically ill  Neuro: Awake and alert, oriented x3, no focal deficits appreciated  HEENT: EOMI, moist mucus membranes  CV: RRR, no murmurs appreciated, no peripheral edema  Pulm: CTAB, no increased work of breathing  Abd: Soft, nontender, nondistended  Skin: Warm, dry and intact  Psych: Appropriate mood and affect    Scheduled Meds   [Held by provider] aspirin, 81 mg, Oral, Daily  atorvastatin, 40 mg, Oral, Nightly  furosemide, 20 mg, Intravenous, BID  multivitamin with minerals, 1 tablet, Oral, Daily  pantoprazole, 40 mg, Oral, Q AM  sertraline, 25 mg, Oral, Daily  sodium chloride, 10 mL, Intravenous, Q12H  traMADol, 50 mg, Oral, Nightly       PRN Meds     acetaminophen **OR** acetaminophen **OR** acetaminophen    albuterol    senna-docusate sodium **AND** polyethylene glycol **AND** bisacodyl **AND** bisacodyl    melatonin    nitroglycerin    ondansetron    sodium chloride    sodium chloride    sodium chloride   Infusions         Diagnostic Data    Results from last 7 days   Lab Units 24  0443 24  0417 24  0014   WBC 10*3/mm3 4.37   < > 8.95   HEMOGLOBIN g/dL 9.7*   < > 11.4*   HEMATOCRIT % 32.3*   < > 37.1*   PLATELETS  10*3/mm3 131*   < > 129*   GLUCOSE mg/dL 86   < > 86   CREATININE mg/dL 1.46*   < > 1.41*   BUN mg/dL 30*   < > 22   SODIUM mmol/L 142   < > 139   POTASSIUM mmol/L 3.4*   < > 4.2   AST (SGOT) U/L  --   --  32   ALT (SGPT) U/L  --   --  10   ALK PHOS U/L  --   --  149*   BILIRUBIN mg/dL  --   --  0.7   ANION GAP mmol/L 7.0   < > 10.6    < > = values in this interval not displayed.       CT Abdomen Pelvis With & Without Contrast    Result Date: 8/2/2024  Impression: 1. Indeterminate 1.9 cm lesion at the lateral cortical margin of the right mid kidney. Given its stability and size and morphology since the CT abdomen of 1/6/2020, it would favor a benign etiology. However, on today's examination, it does appear to demonstrate contrast enhancement, which would favor malignant etiology. Any further decision as to whether to continue imaging surveillance versus soft tissue biopsy should be based upon clinical assessment for this 84-year-old patient. 2. Bilateral renal cysts. 3. Heavy calcific atherosclerosis within the bilateral renal arteries, left greater than right. Left renal artery stenosis not excluded. 4.. Mild to moderate left renal atrophy with multifocal cortical scarring. 5. Tiny nonobstructing right renal stone. 6. Moderate right pleural effusion with right basilar atelectasis. Chronic left basilar pleural effusion and chronic rounded to left basilar atelectasis. 7. Sigmoid colonic diverticulosis. Electronically Signed: Abigail William MD  8/2/2024 10:29 AM EDT  Workstation ID: [id number]     Interval results reviewed.    Assessment/Plan:     Active and Resolved Problems  Active Hospital Problems    Diagnosis  POA    **CHF exacerbation [I50.9]  Yes    Severe malnutrition [E43]  Yes    CAD (coronary artery disease) [I25.10]  Yes    Heart failure, unspecified [I50.9]  Yes    Chronic obstructive pulmonary disease, unspecified [J44.9]  Yes    Depression, unspecified [F32.A]  Yes      Resolved Hospital Problems   No  resolved problems to display.     #Acute on chronic systolic heart failure  - Cardiology consulted, appreciate recs  - EF 30%, G2DD, severe AS  - Continue diuresis with lasix 20 IV bid  - Discontinue midodrine today in setting of hypertension; ctm bp prior to resuming home anti-hypertensives     #Gross hematuria  #Anemia  - Urology consulted, appreciate recs  - CT w/ lesion in L kidney; will require outpatient follow up  - Cystoscopy w/ trauma and mild stricture noted; no intervention at this time  - Hgb stable overnight with resolution of gross hematuria     #Bradycardia  - Hold bb, improved at this time     #Elevated troponin  - 110>137; EKG without acute ST changes   - Likely secondary to demand ischemia     #CAD  #Hyperlipidemia  - s/p CABG  - Continue statin; hold asa in setting of gross hematuria w/ drop in hgb with plans to resume if hgb remains stable     #Depression  - Continue sertraline    VTE Prophylaxis:  Mechanical VTE prophylaxis orders are present.    Code status is   Code Status and Medical Interventions: CPR (Attempt to Resuscitate); Full Support   Ordered at: 08/01/24 0326     Code Status (Patient has no pulse and is not breathing):    CPR (Attempt to Resuscitate)     Medical Interventions (Patient has pulse or is breathing):    Full Support       Time: 30 minutes    Signature: Electronically signed by Kelly Rodriguez MD, 08/03/24, 12:07 EDT.  [facility] Hospitalist Team

## 2024-08-04 LAB
ALBUMIN SERPL-MCNC: 3.4 G/DL (ref 3.5–5.2)
ALBUMIN/GLOB SERPL: 1.5 G/DL
ALP SERPL-CCNC: 139 U/L (ref 39–117)
ALT SERPL W P-5'-P-CCNC: 13 U/L (ref 1–41)
ANION GAP SERPL CALCULATED.3IONS-SCNC: 6.2 MMOL/L (ref 5–15)
AST SERPL-CCNC: 27 U/L (ref 1–40)
BILIRUB SERPL-MCNC: 0.4 MG/DL (ref 0–1.2)
BUN SERPL-MCNC: 22 MG/DL (ref 8–23)
BUN/CREAT SERPL: 19.8 (ref 7–25)
CALCIUM SPEC-SCNC: 9 MG/DL (ref 8.6–10.5)
CHLORIDE SERPL-SCNC: 100 MMOL/L (ref 98–107)
CO2 SERPL-SCNC: 35.8 MMOL/L (ref 22–29)
CREAT SERPL-MCNC: 1.11 MG/DL (ref 0.76–1.27)
DEPRECATED RDW RBC AUTO: 56.4 FL (ref 37–54)
EGFRCR SERPLBLD CKD-EPI 2021: 65.1 ML/MIN/1.73
ERYTHROCYTE [DISTWIDTH] IN BLOOD BY AUTOMATED COUNT: 15.9 % (ref 12.3–15.4)
GLOBULIN UR ELPH-MCNC: 2.2 GM/DL
GLUCOSE SERPL-MCNC: 110 MG/DL (ref 65–99)
HCT VFR BLD AUTO: 32.8 % (ref 37.5–51)
HGB BLD-MCNC: 9.8 G/DL (ref 13–17.7)
MCH RBC QN AUTO: 29.1 PG (ref 26.6–33)
MCHC RBC AUTO-ENTMCNC: 29.9 G/DL (ref 31.5–35.7)
MCV RBC AUTO: 97.3 FL (ref 79–97)
PLATELET # BLD AUTO: 144 10*3/MM3 (ref 140–450)
PMV BLD AUTO: 9.9 FL (ref 6–12)
POTASSIUM SERPL-SCNC: 3.1 MMOL/L (ref 3.5–5.2)
PROT SERPL-MCNC: 5.6 G/DL (ref 6–8.5)
RBC # BLD AUTO: 3.37 10*6/MM3 (ref 4.14–5.8)
SODIUM SERPL-SCNC: 142 MMOL/L (ref 136–145)
SODIUM UR-SCNC: 108 MMOL/L
WBC NRBC COR # BLD AUTO: 3.91 10*3/MM3 (ref 3.4–10.8)

## 2024-08-04 PROCEDURE — 85027 COMPLETE CBC AUTOMATED: CPT

## 2024-08-04 PROCEDURE — 99232 SBSQ HOSP IP/OBS MODERATE 35: CPT | Performed by: INTERNAL MEDICINE

## 2024-08-04 PROCEDURE — 84300 ASSAY OF URINE SODIUM: CPT | Performed by: INTERNAL MEDICINE

## 2024-08-04 PROCEDURE — 25010000002 FUROSEMIDE PER 20 MG: Performed by: INTERNAL MEDICINE

## 2024-08-04 PROCEDURE — 80053 COMPREHEN METABOLIC PANEL: CPT | Performed by: STUDENT IN AN ORGANIZED HEALTH CARE EDUCATION/TRAINING PROGRAM

## 2024-08-04 PROCEDURE — 82570 ASSAY OF URINE CREATININE: CPT | Performed by: INTERNAL MEDICINE

## 2024-08-04 RX ORDER — CARVEDILOL 6.25 MG/1
6.25 TABLET ORAL 2 TIMES DAILY WITH MEALS
Status: DISCONTINUED | OUTPATIENT
Start: 2024-08-04 | End: 2024-08-05

## 2024-08-04 RX ORDER — LISINOPRIL 5 MG/1
2.5 TABLET ORAL
Status: DISCONTINUED | OUTPATIENT
Start: 2024-08-04 | End: 2024-08-05

## 2024-08-04 RX ORDER — POTASSIUM CHLORIDE 20 MEQ/1
40 TABLET, EXTENDED RELEASE ORAL EVERY 4 HOURS
Status: COMPLETED | OUTPATIENT
Start: 2024-08-04 | End: 2024-08-04

## 2024-08-04 RX ADMIN — POTASSIUM CHLORIDE 40 MEQ: 1500 TABLET, EXTENDED RELEASE ORAL at 20:17

## 2024-08-04 RX ADMIN — TRAMADOL HYDROCHLORIDE 50 MG: 50 TABLET ORAL at 20:17

## 2024-08-04 RX ADMIN — Medication 10 ML: at 20:22

## 2024-08-04 RX ADMIN — ATORVASTATIN CALCIUM 40 MG: 40 TABLET, FILM COATED ORAL at 20:17

## 2024-08-04 RX ADMIN — SERTRALINE HYDROCHLORIDE 25 MG: 25 TABLET ORAL at 08:50

## 2024-08-04 RX ADMIN — PANTOPRAZOLE SODIUM 40 MG: 40 TABLET, DELAYED RELEASE ORAL at 05:52

## 2024-08-04 RX ADMIN — LISINOPRIL 2.5 MG: 5 TABLET ORAL at 08:49

## 2024-08-04 RX ADMIN — Medication 1 TABLET: at 08:49

## 2024-08-04 RX ADMIN — Medication 10 ML: at 08:50

## 2024-08-04 RX ADMIN — POTASSIUM CHLORIDE 40 MEQ: 1500 TABLET, EXTENDED RELEASE ORAL at 11:27

## 2024-08-04 RX ADMIN — POTASSIUM CHLORIDE 40 MEQ: 1500 TABLET, EXTENDED RELEASE ORAL at 16:31

## 2024-08-04 RX ADMIN — FUROSEMIDE 20 MG: 10 INJECTION, SOLUTION INTRAMUSCULAR; INTRAVENOUS at 08:50

## 2024-08-04 NOTE — PROGRESS NOTES
[facility] MEDICINE SERVICE  DAILY PROGRESS NOTE    NAME: Andrea Luna  : 1940  MRN: [id number]      LOS: 1 day     PROVIDER OF SERVICE: Kelly Rodriguez MD    Chief Complaint: CHF exacerbation    Subjective:     Interval History:    Patient reports breathing is improved.  No new complaints.  BP elevated this morning.    Review of Systems:   Denies fevers, chills  Denies chest pain, edema, palpitations  Improved shortness of breath, denies cough  Denies nausea, vomiting, diarrhea  Denies dysuria, hematuria    Objective:     Vital Signs  Temp:  [97.6 °F (36.4 °C)-98.4 °F (36.9 °C)] 97.6 °F (36.4 °C)  Heart Rate:  [61-69] 61  Resp:  [12-18] 18  BP: (142-179)/(70-89) 149/78  Flow (L/min):  [1-2] 1   Body mass index is 20.32 kg/m².    Physical Exam  General: No acute distress, appears chronically ill, hard of hearing  Neuro: Awake and alert, oriented x3, no focal deficits appreciated  HEENT: EOMI, moist mucus membranes  CV: RRR, no murmurs appreciated, no peripheral edema  Pulm: CTAB, no increased work of breathing  Abd: Soft, nontender, nondistended  Skin: Warm, dry and intact  Psych: Appropriate mood and affect    Scheduled Meds   [Held by provider] aspirin, 81 mg, Oral, Daily  atorvastatin, 40 mg, Oral, Nightly  [Held by provider] carvedilol, 6.25 mg, Oral, BID With Meals  lisinopril, 2.5 mg, Oral, Q24H  multivitamin with minerals, 1 tablet, Oral, Daily  pantoprazole, 40 mg, Oral, Q AM  potassium chloride ER, 40 mEq, Oral, Q4H  sertraline, 25 mg, Oral, Daily  sodium chloride, 10 mL, Intravenous, Q12H  traMADol, 50 mg, Oral, Nightly       PRN Meds     acetaminophen **OR** acetaminophen **OR** acetaminophen    albuterol    senna-docusate sodium **AND** polyethylene glycol **AND** bisacodyl **AND** bisacodyl    melatonin    nitroglycerin    ondansetron    Potassium Replacement - Follow Nurse / BPA Driven Protocol    sodium chloride    sodium chloride    sodium chloride   Infusions         Diagnostic  Data    Results from last 7 days   Lab Units 08/04/24  1020 08/04/24  0406   WBC 10*3/mm3  --  3.91   HEMOGLOBIN g/dL  --  9.8*   HEMATOCRIT %  --  32.8*   PLATELETS 10*3/mm3  --  144   GLUCOSE mg/dL 110*  --    CREATININE mg/dL 1.11  --    BUN mg/dL 22  --    SODIUM mmol/L 142  --    POTASSIUM mmol/L 3.1*  --    AST (SGOT) U/L 27  --    ALT (SGPT) U/L 13  --    ALK PHOS U/L 139*  --    BILIRUBIN mg/dL 0.4  --    ANION GAP mmol/L 6.2  --        No radiology results for the last day    Interval results reviewed.    Assessment/Plan:     Active and Resolved Problems  Active Hospital Problems    Diagnosis  POA    **CHF exacerbation [I50.9]  Yes    Severe malnutrition [E43]  Yes    CAD (coronary artery disease) [I25.10]  Yes    Heart failure, unspecified [I50.9]  Yes    Chronic obstructive pulmonary disease, unspecified [J44.9]  Yes    Depression, unspecified [F32.A]  Yes      Resolved Hospital Problems   No resolved problems to display.     #Acute on chronic systolic heart failure  - Cardiology consulted, appreciate recs  - EF 30%, G2DD, severe AS  - Continue diuresis with lasix 20 IV bid  - Wean oxygen as able    #Hypertension  - Midodrine discontinued in setting of hypertension; pt bp remains elevated  - Unable to resume coreg in setting of bradycardia  - Discussed options of anti hypertensives with Dr. Irizarry given relative contraindications to most medication options given acute heart failure, bradycardia and severe aortic stenosis with ongoing srikanth; decision made to start low dose lisinopril as best option given risks and benefits of other medication classes; will monitor on this    #SRIKANTH  - In setting of acute on chronic heart failure  - Likely CRS given presentation and improvement with diuresis  - BL Cr 0.8-0.9; peak 1.56 during admission >> now 1.1  - AM BMP to continue to monitor improvement with diuresis     #Gross hematuria  #Anemia  - Urology consulted, appreciate recs  - CT w/ lesion in L kidney; will  require outpatient follow up  - Cystoscopy w/ trauma and mild stricture noted; no intervention at this time  - Hgb stable, morning CBC     #Bradycardia  - Hold bb, improved at this time    #Severe aortic stenosis  - Needs to follow up with Flavio for further TAVR eval after discharge     #Elevated troponin  - 110>137; EKG without acute ST changes   - Likely secondary to demand ischemia     #CAD  #Hyperlipidemia  - s/p CABG  - Continue statin; hold asa in setting of gross hematuria w/ drop in hgb with plans to resume if hgb remains stable     #Depression  - Continue sertraline    VTE Prophylaxis:  Mechanical VTE prophylaxis orders are present.         Code status is   Code Status and Medical Interventions: CPR (Attempt to Resuscitate); Full Support   Ordered at: 08/01/24 0326     Code Status (Patient has no pulse and is not breathing):    CPR (Attempt to Resuscitate)     Medical Interventions (Patient has pulse or is breathing):    Full Support         Time: 30 minutes    Signature: Electronically signed by Kelly Rodriguez MD, 08/04/24, 12:03 EDT.  [facility] Hospitalist Team

## 2024-08-04 NOTE — PROGRESS NOTES
Cardiology Progress Note      PATIENT IDENTIFICATION    Name: Andrea Luna  Age: 84 y.o. Sex: male : 1940  MRN: [id number]    Requesting Provider    Kelly Rodriguez MD     LOS: 1 day       Reason For Followup:  Heart failure reduced ejection fraction  Aortic stenosis  Elevated troponin  Coronary artery disease      Subjective:    Interval History:  Seen and examined.  Chart and labs reviewed.  Patient denies any chest pain pressure heaviness tightness.  No complaints today.  Patient is net 3245 negative length of stay.  Discussed case with admitting service.  Blood pressure higher today.  Creatinine has improved.  Will introduce low-dose lisinopril and monitor renal function closely.  No good options given patient's multiple comorbid conditions.    Review of Systems:  A complete review of systems was undertaken with the pertinent cardiovascular findings listed in history of present illness and all other systems being negative.     Assessment & Plan    Impressions:  Heart failure typically with midrange ejection fraction now with reduced ejection fraction of 30%  Hypertension  Severe aortic stenosis  Elevated troponin likely demand ischemia related  Multivessel coronary artery disease with history of CABG  Bradycardia  Renal insufficiency    Recommendations:  Continue diuresis as renal function allows  Add lisinopril 2.5 mg daily  Monitor renal function and electrolytes with IV diuresis and ACE inhibitor to observe for toxicities  Monitor rate and rhythm  Avoid beta-blocker secondary to bradycardia  Patient will need to be plugged back into the [facility] system at discharge where his TAVR workup is already in progress        Objective:    Medication Review:   Scheduled Meds:[Held by provider] aspirin, 81 mg, Oral, Daily  atorvastatin, 40 mg, Oral, Nightly  [Held by provider] carvedilol, 6.25 mg, Oral, BID With Meals  lisinopril, 2.5 mg, Oral, Q24H  multivitamin with minerals, 1 tablet, Oral,  Daily  pantoprazole, 40 mg, Oral, Q AM  sertraline, 25 mg, Oral, Daily  sodium chloride, 10 mL, Intravenous, Q12H  traMADol, 50 mg, Oral, Nightly      Continuous Infusions:   PRN Meds:.  acetaminophen **OR** acetaminophen **OR** acetaminophen    albuterol    senna-docusate sodium **AND** polyethylene glycol **AND** bisacodyl **AND** bisacodyl    melatonin    nitroglycerin    ondansetron    sodium chloride    sodium chloride    sodium chloride      CHF exacerbation    CAD (coronary artery disease)    Depression, unspecified    Heart failure, unspecified    Chronic obstructive pulmonary disease, unspecified    Severe malnutrition         Physical Exam:    General: Alert, cooperative, no distress, appears stated age.  Frail  Head:  Normocephalic, atraumatic, mucous membranes moist  Eyes:  Conjunctivae/corneas clear, EOMs intact     Neck:  Supple,  no bruit  Lungs:  Coarse and diminished bilaterally.  Chest wall: No tenderness  Heart::  Regular rate and rhythm, S1 and S2 normal, 3/6 late peaking systolic ejection murmur.  No rub or gallop  Abdomen: Soft, nontender, nondistended, bowel sounds active  Extremities: No cyanosis, clubbing.  Edema noted  Pulses: Diminished pedal pulses  Skin:  No rashes or lesions  Neuro/psych: A&O x3. CN II through XII are grossly intact with appropriate affect    Vital Signs:  Vitals:    08/03/24 1700 08/03/24 1948 08/04/24 0351 08/04/24 0651   BP:  152/73 142/70 170/83   BP Location:  Left arm Left arm Left arm   Patient Position:  Lying Lying Lying   Pulse:  69 65 64   Resp:  13 13 12   Temp:  98.4 °F (36.9 °C) 98.1 °F (36.7 °C) 97.7 °F (36.5 °C)   TempSrc:  Oral Oral Oral   SpO2:  100%  90%   Weight: 57.1 kg (125 lb 14.1 oz)      Height:         Wt Readings from Last 1 Encounters:   08/03/24 57.1 kg (125 lb 14.1 oz)       Intake/Output Summary (Last 24 hours) at 8/4/2024 1005  Last data filed at 8/4/2024 0900  Gross per 24 hour   Intake 750 ml   Output 1500 ml   Net -750 ml  "        Results Review:     CBC    Results from last 7 days   Lab Units 08/04/24  0406 08/03/24  0443 08/02/24  0306 08/01/24  0014   WBC 10*3/mm3 3.91 4.37 4.71 8.95   HEMOGLOBIN g/dL 9.8* 9.7* 9.2* 11.4*   PLATELETS 10*3/mm3 144 131* 113* 129*     Cr Clearance Estimated Creatinine Clearance: 29.9 mL/min (A) (by C-G formula based on SCr of 1.46 mg/dL (H)).  Coag     HbA1C   Lab Results   Component Value Date    HGBA1C 5.5 06/01/2021     Blood Glucose No results found for: \"POCGLU\"  Infection   Results from last 7 days   Lab Units 08/01/24  0147 08/01/24  0014   BLOODCX  No growth at 3 days No growth at 3 days     CMP   Results from last 7 days   Lab Units 08/03/24  0443 08/02/24  0306 08/01/24 0417 08/01/24  0014   SODIUM mmol/L 142 139 141 139   POTASSIUM mmol/L 3.4* 3.4* 3.7 4.2   CHLORIDE mmol/L 104 103 106 104   CO2 mmol/L 31.0* 28.7 25.5 24.4   BUN mg/dL 30* 31* 22 22   CREATININE mg/dL 1.46* 1.56* 1.26 1.41*   GLUCOSE mg/dL 86 88 85 86   ALBUMIN g/dL  --   --   --  3.5   BILIRUBIN mg/dL  --   --   --  0.7   ALK PHOS U/L  --   --   --  149*   AST (SGOT) U/L  --   --   --  32   ALT (SGPT) U/L  --   --   --  10     ABG    Results from last 7 days   Lab Units 08/01/24  0014   PH, ARTERIAL pH units 7.339*   PCO2, ARTERIAL mm Hg 48.9*   PO2 ART mm Hg 103.4   O2 SATURATION ART % 97.5   BASE EXCESS ART mmol/L 0.2     UA    Results from last 7 days   Lab Units 08/01/24  0207   NITRITE UA  Negative   WBC UA /HPF 3-5*   BACTERIA UA /HPF None Seen   SQUAM EPITHEL UA /HPF 3-6*     BALJEET  No results found for: \"POCMETH\", \"POCAMPHET\", \"POCBARBITUR\", \"POCBENZO\", \"POCCOCAINE\", \"POCOPIATES\", \"POCOXYCODO\", \"POCPHENCYC\", \"POCPROPOXY\", \"POCTHC\", \"POCTRICYC\"  Lysis Labs   Results from last 7 days   Lab Units 08/04/24  0406 08/03/24  0443 08/02/24  0306 08/01/24  0417 08/01/24  0014   HEMOGLOBIN g/dL 9.8* 9.7* 9.2*  --  11.4*   PLATELETS 10*3/mm3 144 131* 113*  --  129*   CREATININE mg/dL  --  1.46* 1.56* 1.26 1.41* "     Radiology(recent) CT Abdomen Pelvis With & Without Contrast    Result Date: 8/2/2024  Impression: 1. Indeterminate 1.9 cm lesion at the lateral cortical margin of the right mid kidney. Given its stability and size and morphology since the CT abdomen of 1/6/2020, it would favor a benign etiology. However, on today's examination, it does appear to demonstrate contrast enhancement, which would favor malignant etiology. Any further decision as to whether to continue imaging surveillance versus soft tissue biopsy should be based upon clinical assessment for this 84-year-old patient. 2. Bilateral renal cysts. 3. Heavy calcific atherosclerosis within the bilateral renal arteries, left greater than right. Left renal artery stenosis not excluded. 4.. Mild to moderate left renal atrophy with multifocal cortical scarring. 5. Tiny nonobstructing right renal stone. 6. Moderate right pleural effusion with right basilar atelectasis. Chronic left basilar pleural effusion and chronic rounded to left basilar atelectasis. 7. Sigmoid colonic diverticulosis. Electronically Signed: Abigail William MD  8/2/2024 10:29 AM EDT  Workstation ID: [id number]       Results from last 7 days   Lab Units 08/01/24  0147   HSTROP T ng/L 137*       Imaging Results (Last 24 Hours)       ** No results found for the last 24 hours. **            Cardiac Studies:  Echo- Results for orders placed during the hospital encounter of 07/31/24    Adult Transthoracic Echo Complete W/ Cont if Necessary Per Protocol    Interpretation Summary    Left ventricular systolic function is normal. Left ventricular ejection fraction appears to be 31 - 35%.    The left ventricular cavity is borderline dilated.    Left ventricular wall thickness is consistent with mild concentric hypertrophy.    Left ventricular diastolic function is consistent with (grade II w/high LAP) pseudonormalization.    Mildly reduced right ventricular systolic function noted.    The right ventricular  cavity is borderline dilated.    The left atrial cavity is severely dilated.    Left atrial volume is severely increased.    The right atrial cavity is severely  dilated.    Severe aortic valve stenosis is present.    Abnormal mitral valve structure consistent with dilated annulus.    Moderate to severe mitral valve regurgitation is present with an eccentric jet noted.    Estimated right ventricular systolic pressure from tricuspid regurgitation is mildly elevated (35-45 mmHg).    Mild pulmonary hypertension is present.    Stress Myoview-  Cath-        Ezio Irizarry DO  08/04/24  10:05 EDT    Copied information has been reviewed and is current as of 08/04/24

## 2024-08-04 NOTE — PROGRESS NOTES
Patient previously seen by Dr. Degroot with [facility]  Will defer renal care to their group.

## 2024-08-04 NOTE — PLAN OF CARE
Continue to monitor and assess pain.  Patient is alert and oriented with no complaints.  Problem: Adjustment to Illness (Heart Failure)  Goal: Optimal Coping  Outcome: Ongoing, Progressing     Problem: Cardiac Output Decreased (Heart Failure)  Goal: Optimal Cardiac Output  Outcome: Ongoing, Progressing     Problem: Dysrhythmia (Heart Failure)  Goal: Stable Heart Rate and Rhythm  Outcome: Ongoing, Progressing     Problem: Fluid Imbalance (Heart Failure)  Goal: Fluid Balance  Outcome: Ongoing, Progressing     Problem: Functional Ability Impaired (Heart Failure)  Goal: Optimal Functional Ability  Outcome: Ongoing, Progressing     Problem: Oral Intake Inadequate (Heart Failure)  Goal: Optimal Nutrition Intake  Outcome: Ongoing, Progressing  Intervention: Promote and Optimize Nutrition Intake  Recent Flowsheet Documentation  Taken 8/4/2024 1805 by Patsy Kaplan RN  Oral Nutrition Promotion:   calorie-dense liquids provided   calorie-dense foods provided     Problem: Respiratory Compromise (Heart Failure)  Goal: Effective Oxygenation and Ventilation  Outcome: Ongoing, Progressing     Problem: Sleep Disordered Breathing (Heart Failure)  Goal: Effective Breathing Pattern During Sleep  Outcome: Ongoing, Progressing     Problem: Adult Inpatient Plan of Care  Goal: Plan of Care Review  Outcome: Ongoing, Progressing  Flowsheets  Taken 8/3/2024 1807 by Nga Kerr LPN  Outcome Evaluation: Pt has been resting comfortably during the shift. Pt reamins on 2L NC which is baseline. He is alert and oreinted. Pt ambulated the victoria with a walker and assistance and also transferred to a chair with assistance. He tolerated the activities well. He has has no complaints and is stable at this time.  Taken 8/2/2024 1703 by Ronaldo Philip OT  Progress: no change  Plan of Care Reviewed With: patient  Goal: Patient-Specific Goal (Individualized)  Outcome: Ongoing, Progressing  Goal: Absence of Hospital-Acquired Illness or  Injury  Outcome: Ongoing, Progressing  Intervention: Identify and Manage Fall Risk  Recent Flowsheet Documentation  Taken 8/4/2024 1805 by Patsy Kaplan RN  Safety Promotion/Fall Prevention:   assistive device/personal items within reach   clutter free environment maintained   fall prevention program maintained   safety round/check completed  Taken 8/4/2024 1628 by Patsy Kaplan RN  Safety Promotion/Fall Prevention:   assistive device/personal items within reach   clutter free environment maintained   fall prevention program maintained   safety round/check completed  Intervention: Prevent Skin Injury  Flowsheets  Taken 8/4/2024 1805 by Patsy Kaplan, PARAMJIT  Skin Protection:   adhesive use limited   skin-to-device areas padded  Taken 8/4/2024 1657 by Dakotah Mcleod PCT  Body Position:   turned   right  Intervention: Prevent and Manage VTE (Venous Thromboembolism) Risk  Flowsheets  Taken 8/3/2024 2030 by Loraine Hartley LPN  VTE Prevention/Management: patient refused intervention  Taken 8/3/2024 1600 by Nga Kerr LPN  Activity Management:   ambulated outside room   up in chair  Intervention: Prevent Infection  Flowsheets (Taken 8/4/2024 1400 by Nga Kerr LPN)  Infection Prevention:   hand hygiene promoted   rest/sleep promoted   single patient room provided  Goal: Optimal Comfort and Wellbeing  Outcome: Ongoing, Progressing  Intervention: Monitor Pain and Promote Comfort  Flowsheets (Taken 8/2/2024 2048 by Loraine Hartley LPN)  Pain Management Interventions: see MAR  Intervention: Provide Person-Centered Care  Flowsheets (Taken 8/4/2024 1805)  Trust Relationship/Rapport: care explained  Goal: Readiness for Transition of Care  Outcome: Ongoing, Progressing  Intervention: Mutually Develop Transition Plan  Flowsheets (Taken 8/3/2024 1330 by Nga Kerr LPN)  Equipment Needed After Discharge: none  Equipment Currently Used at Home:   grab bar   shower chair   cane, straight    wheelchair   walker, standard   oxygen  Anticipated Changes Related to Illness: none  Transportation Anticipated: health plan transportation  Transportation Concerns: none  Concerns to be Addressed:   no discharge needs identified   denies needs/concerns at this time  Readmission Within the Last 30 Days: no previous admission in last 30 days  Patient/Family Anticipated Services at Transition: none  Patient/Family Anticipates Transition to:   long-term care facility   other (see comments)     Problem: Skin Injury Risk Increased  Goal: Skin Health and Integrity  Outcome: Ongoing, Progressing  Intervention: Promote and Optimize Oral Intake  Flowsheets (Taken 8/4/2024 1805)  Oral Nutrition Promotion:   calorie-dense liquids provided   calorie-dense foods provided  Intervention: Optimize Skin Protection  Flowsheets  Taken 8/4/2024 1805 by Patsy Kaplan RN  Skin Protection:   adhesive use limited   skin-to-device areas padded  Taken 8/4/2024 1657 by Dakotah Mcleod PCT  Head of Bed (HOB) Positioning: HOB at 20-30 degrees     Problem: Fall Injury Risk  Goal: Absence of Fall and Fall-Related Injury  Outcome: Ongoing, Progressing  Intervention: Identify and Manage Contributors  Flowsheets (Taken 8/4/2024 1400 by Nga Kerr LPN)  Medication Review/Management: medications reviewed  Intervention: Promote Injury-Free Environment  Recent Flowsheet Documentation  Taken 8/4/2024 1805 by Patsy Kaplan RN  Safety Promotion/Fall Prevention:   assistive device/personal items within reach   clutter free environment maintained   fall prevention program maintained   safety round/check completed  Taken 8/4/2024 1628 by Patsy Kaplan RN  Safety Promotion/Fall Prevention:   assistive device/personal items within reach   clutter free environment maintained   fall prevention program maintained   safety round/check completed     Problem: Malnutrition  Goal: Improved Nutritional Intake  Outcome: Ongoing,  Progressing  Intervention: Promote and Optimize Oral Intake  Flowsheets (Taken 8/4/2024 1805)  Oral Nutrition Promotion:   calorie-dense liquids provided   calorie-dense foods provided   Goal Outcome Evaluation:

## 2024-08-04 NOTE — PLAN OF CARE
Goal Outcome Evaluation:         Pleasant and cooperative. A/Ox3. Gets time mixed up. Thought they were serving breakfast at dinner time. Uses urinal and will call for assistance if needed. Did get him to drink a boost. Slept most of shift.

## 2024-08-05 LAB
ANION GAP SERPL CALCULATED.3IONS-SCNC: 5.8 MMOL/L (ref 5–15)
BASOPHILS # BLD AUTO: 0.03 10*3/MM3 (ref 0–0.2)
BASOPHILS NFR BLD AUTO: 0.8 % (ref 0–1.5)
BUN SERPL-MCNC: 24 MG/DL (ref 8–23)
BUN/CREAT SERPL: 21.4 (ref 7–25)
CALCIUM SPEC-SCNC: 8.7 MG/DL (ref 8.6–10.5)
CHLORIDE SERPL-SCNC: 103 MMOL/L (ref 98–107)
CO2 SERPL-SCNC: 34.2 MMOL/L (ref 22–29)
CREAT SERPL-MCNC: 1.12 MG/DL (ref 0.76–1.27)
CREAT UR-MCNC: 33.1 MG/DL
DEPRECATED RDW RBC AUTO: 56.1 FL (ref 37–54)
EGFRCR SERPLBLD CKD-EPI 2021: 64.4 ML/MIN/1.73
EOSINOPHIL # BLD AUTO: 0.19 10*3/MM3 (ref 0–0.4)
EOSINOPHIL NFR BLD AUTO: 5.3 % (ref 0.3–6.2)
ERYTHROCYTE [DISTWIDTH] IN BLOOD BY AUTOMATED COUNT: 15.8 % (ref 12.3–15.4)
GLUCOSE SERPL-MCNC: 113 MG/DL (ref 65–99)
HCT VFR BLD AUTO: 30.1 % (ref 37.5–51)
HGB BLD-MCNC: 8.9 G/DL (ref 13–17.7)
IMM GRANULOCYTES # BLD AUTO: 0.01 10*3/MM3 (ref 0–0.05)
IMM GRANULOCYTES NFR BLD AUTO: 0.3 % (ref 0–0.5)
LYMPHOCYTES # BLD AUTO: 0.99 10*3/MM3 (ref 0.7–3.1)
LYMPHOCYTES NFR BLD AUTO: 27.6 % (ref 19.6–45.3)
MCH RBC QN AUTO: 29.2 PG (ref 26.6–33)
MCHC RBC AUTO-ENTMCNC: 29.6 G/DL (ref 31.5–35.7)
MCV RBC AUTO: 98.7 FL (ref 79–97)
MONOCYTES # BLD AUTO: 0.31 10*3/MM3 (ref 0.1–0.9)
MONOCYTES NFR BLD AUTO: 8.6 % (ref 5–12)
NEUTROPHILS NFR BLD AUTO: 2.06 10*3/MM3 (ref 1.7–7)
NEUTROPHILS NFR BLD AUTO: 57.4 % (ref 42.7–76)
NRBC BLD AUTO-RTO: 0 /100 WBC (ref 0–0.2)
PLATELET # BLD AUTO: 134 10*3/MM3 (ref 140–450)
PMV BLD AUTO: 9.6 FL (ref 6–12)
POTASSIUM SERPL-SCNC: 4.4 MMOL/L (ref 3.5–5.2)
RBC # BLD AUTO: 3.05 10*6/MM3 (ref 4.14–5.8)
SODIUM SERPL-SCNC: 143 MMOL/L (ref 136–145)
WBC NRBC COR # BLD AUTO: 3.59 10*3/MM3 (ref 3.4–10.8)

## 2024-08-05 PROCEDURE — 94618 PULMONARY STRESS TESTING: CPT

## 2024-08-05 PROCEDURE — 85025 COMPLETE CBC W/AUTO DIFF WBC: CPT | Performed by: STUDENT IN AN ORGANIZED HEALTH CARE EDUCATION/TRAINING PROGRAM

## 2024-08-05 PROCEDURE — 80048 BASIC METABOLIC PNL TOTAL CA: CPT | Performed by: STUDENT IN AN ORGANIZED HEALTH CARE EDUCATION/TRAINING PROGRAM

## 2024-08-05 PROCEDURE — 97530 THERAPEUTIC ACTIVITIES: CPT

## 2024-08-05 PROCEDURE — 97110 THERAPEUTIC EXERCISES: CPT

## 2024-08-05 PROCEDURE — 97116 GAIT TRAINING THERAPY: CPT

## 2024-08-05 RX ORDER — LISINOPRIL 5 MG/1
5 TABLET ORAL
Status: DISCONTINUED | OUTPATIENT
Start: 2024-08-06 | End: 2024-08-06 | Stop reason: HOSPADM

## 2024-08-05 RX ORDER — CARVEDILOL 3.12 MG/1
3.12 TABLET ORAL 2 TIMES DAILY WITH MEALS
Status: DISCONTINUED | OUTPATIENT
Start: 2024-08-05 | End: 2024-08-06 | Stop reason: HOSPADM

## 2024-08-05 RX ORDER — FUROSEMIDE 20 MG/1
20 TABLET ORAL DAILY
Status: DISCONTINUED | OUTPATIENT
Start: 2024-08-05 | End: 2024-08-06 | Stop reason: HOSPADM

## 2024-08-05 RX ADMIN — SERTRALINE HYDROCHLORIDE 25 MG: 25 TABLET ORAL at 08:21

## 2024-08-05 RX ADMIN — ATORVASTATIN CALCIUM 40 MG: 40 TABLET, FILM COATED ORAL at 20:05

## 2024-08-05 RX ADMIN — Medication 10 ML: at 08:20

## 2024-08-05 RX ADMIN — TRAMADOL HYDROCHLORIDE 50 MG: 50 TABLET ORAL at 20:05

## 2024-08-05 RX ADMIN — FUROSEMIDE 20 MG: 20 TABLET ORAL at 14:47

## 2024-08-05 RX ADMIN — CARVEDILOL 3.12 MG: 3.12 TABLET, FILM COATED ORAL at 17:22

## 2024-08-05 RX ADMIN — LISINOPRIL 2.5 MG: 5 TABLET ORAL at 08:20

## 2024-08-05 RX ADMIN — Medication 1 TABLET: at 08:20

## 2024-08-05 RX ADMIN — Medication 10 ML: at 20:05

## 2024-08-05 RX ADMIN — PANTOPRAZOLE SODIUM 40 MG: 40 TABLET, DELAYED RELEASE ORAL at 05:33

## 2024-08-05 NOTE — PROCEDURES
Exercise Oximetry    Patient Name:Andrea Luna   MRN: [id number]   Date: 08/05/24             ROOM AIR BASELINE   SpO2% 84   Heart Rate    Blood Pressure      EXERCISE ON ROOM AIR SpO2% EXERCISE ON O2 @ 2 LPM SpO2%   1 MINUTE  1 MINUTE 92   2 MINUTES  2 MINUTES 94   3 MINUTES  3 MINUTES 93   4 MINUTES  4 MINUTES 95   5 MINUTES  5 MINUTES 94   6 MINUTES  6 MINUTES 95              Distance Walked   Distance Walked   Dyspnea (Sujatha Scale)   Dyspnea (Sujatha Scale)   Fatigue (Sujatha Scale)   Fatigue (Sujatha Scale)   SpO2% Post Exercise   SpO2% Post Exercise 98 on 2 L   HR Post Exercise   HR Post Exercise   Time to Recovery   Time to Recovery     Comments: Titrated to 2 L to maintain sats greater than 88% with and without ambulation.

## 2024-08-05 NOTE — CASE MANAGEMENT/SOCIAL WORK
Continued Stay Note   Eduardo     Patient Name: Andrea Luna  MRN: [id number]  Today's Date: 8/5/2024    Admit Date: 7/31/2024    Plan: [facility] (pending acceptance.) PASRR approved. No precert required.   Discharge Plan       Row Name 08/05/24 1600       Plan    Plan [facility] (pending acceptance.) PASRR approved. No precert required.    Plan Comments CM spoke with angeli Garcia via phone to inform that [facility] would not be able to continue care for pt due to high accuity, and to obtain SNF choices: 1) [facility] referral placed and liaison Yvonne allen (pending acceptance.) DC barriers: pending AM labs, 2L NC, pending SNF placement. Nephrology, urology, heart failure navigator, Cardiology following.                 Alison Toney RN     Office phone: [phone]  Office fax: [phone]

## 2024-08-05 NOTE — PLAN OF CARE
Problem: Adjustment to Illness (Heart Failure)  Goal: Optimal Coping  Outcome: Ongoing, Progressing     Problem: Cardiac Output Decreased (Heart Failure)  Goal: Optimal Cardiac Output  Outcome: Ongoing, Progressing     Problem: Dysrhythmia (Heart Failure)  Goal: Stable Heart Rate and Rhythm  Outcome: Ongoing, Progressing     Problem: Fluid Imbalance (Heart Failure)  Goal: Fluid Balance  Outcome: Ongoing, Progressing     Problem: Functional Ability Impaired (Heart Failure)  Goal: Optimal Functional Ability  Outcome: Ongoing, Progressing     Problem: Oral Intake Inadequate (Heart Failure)  Goal: Optimal Nutrition Intake  Outcome: Ongoing, Progressing     Problem: Respiratory Compromise (Heart Failure)  Goal: Effective Oxygenation and Ventilation  Outcome: Ongoing, Progressing     Problem: Sleep Disordered Breathing (Heart Failure)  Goal: Effective Breathing Pattern During Sleep  Outcome: Ongoing, Progressing     Problem: Adult Inpatient Plan of Care  Goal: Plan of Care Review  Outcome: Ongoing, Progressing  Goal: Patient-Specific Goal (Individualized)  Outcome: Ongoing, Progressing  Goal: Absence of Hospital-Acquired Illness or Injury  Outcome: Ongoing, Progressing  Intervention: Identify and Manage Fall Risk  Recent Flowsheet Documentation  Taken 8/5/2024 1200 by Asha Guerrero, RN  Safety Promotion/Fall Prevention: safety round/check completed  Taken 8/5/2024 1000 by Asha Guerrero, RN  Safety Promotion/Fall Prevention: safety round/check completed  Taken 8/5/2024 0828 by Asha Guerrero, RN  Safety Promotion/Fall Prevention: safety round/check completed  Goal: Optimal Comfort and Wellbeing  Outcome: Ongoing, Progressing  Goal: Readiness for Transition of Care  Outcome: Ongoing, Progressing     Problem: Skin Injury Risk Increased  Goal: Skin Health and Integrity  Outcome: Ongoing, Progressing     Problem: Fall Injury Risk  Goal: Absence of Fall and Fall-Related Injury  Outcome: Ongoing,  Progressing  Intervention: Promote Injury-Free Environment  Recent Flowsheet Documentation  Taken 8/5/2024 1200 by Asha Guerrero, RN  Safety Promotion/Fall Prevention: safety round/check completed  Taken 8/5/2024 1000 by Asha Guerrero, RN  Safety Promotion/Fall Prevention: safety round/check completed  Taken 8/5/2024 0828 by Asha Guerrero, RN  Safety Promotion/Fall Prevention: safety round/check completed     Problem: Malnutrition  Goal: Improved Nutritional Intake  Outcome: Ongoing, Progressing   Goal Outcome Evaluation:

## 2024-08-05 NOTE — THERAPY TREATMENT NOTE
"Subjective: Pt/nursing agreeable to therapeutic plan of care.    Objective:       Bed mobility - Min-A  Transfers -  Min A with FWW  Ambulation - 100 feet with FWW and CGA/Min A; PT managing oxygen tubing     Therapeutic Exercise - 15 Reps B LE AROM lying supine with HOB elevated; PT cuing provided for proper technique     Vitals: WNL    Pain: 0 VAS      Education: Verbal/Tactile Cues    Assessment: Andrea Luna presents with functional mobility impairments which indicate the need for skilled intervention. Tolerating session today without incident. Pt agreeable to skilled interventions performed. Will continue to follow and progress as tolerated.     Plan/Recommendations:   If medically appropriate, Moderate Intensity Therapy recommended post-acute care. This is recommended as therapy feels the patient would require 3-4 days per week and wouldn't tolerate \"3 hour daily\" rehab intensity. SNF would be the preferred choice. If the patient does not agree to SNF, arrange HH or OP depending on home bound status. If patient is medically complex, consider LTACH. Pt requires no DME at discharge.     Pt desires Skilled Rehab placement at discharge. Pt cooperative; agreeable to therapeutic recommendations and plan of care.         Basic Mobility 6-click:  Rollin = Total, A lot = 2, A little = 3; 4 = None  Supine>Sit:   1 = Total, A lot = 2, A little = 3; 4 = None   Sit>Stand with arms:  1 = Total, A lot = 2, A little = 3; 4 = None  Bed>Chair:   1 = Total, A lot = 2, A little = 3; 4 = None  Ambulate in room:  1 = Total, A lot = 2, A little = 3; 4 = None  3-5 Steps with railin = Total, A lot = 2, A little = 3; 4 = None  Score: 16        Post-Tx Position: Supine with HOB Elevated, Alarms activated, and Call light and personal items within reach  PPE: gloves    "

## 2024-08-05 NOTE — PLAN OF CARE
Goal Outcome Evaluation:  Plan of Care Reviewed With: patient        Progress: no change  Outcome Evaluation: Patient has slept throughout the night. Patient complained he's not been fed since he's been here.

## 2024-08-05 NOTE — PLAN OF CARE
"Goal Outcome Evaluation:                 Assessment: Andrea Luna presents with functional mobility impairments which indicate the need for skilled intervention. Tolerating session today without incident. Pt agreeable to skilled interventions performed. Will continue to follow and progress as tolerated.     Plan/Recommendations:   If medically appropriate, Moderate Intensity Therapy recommended post-acute care. This is recommended as therapy feels the patient would require 3-4 days per week and wouldn't tolerate \"3 hour daily\" rehab intensity. SNF would be the preferred choice. If the patient does not agree to SNF, arrange HH or OP depending on home bound status. If patient is medically complex, consider LTACH. Pt requires no DME at discharge.     Pt desires Skilled Rehab placement at discharge. Pt cooperative; agreeable to therapeutic recommendations and plan of care.                              "

## 2024-08-05 NOTE — DISCHARGE PLACEMENT REQUEST
"Helena Luna (84 y.o. Male)       Date of Birth   1940    Social Security Number       Address   [address] [place] [facility]    Home Phone   [phone]    MRN   [id number]       [place]   [employer]    Marital Status                               Admission Date   7/31/24    Admission Type   Emergency    Admitting Provider   Kelly Rodriguez MD    Attending Provider   Blank Samuel MD    Department, Room/Bed   [facility] 3C MEDICAL INPATIENT, 376/1       Discharge Date       Discharge Disposition       Discharge Destination                                 Attending Provider: Blank Samuel MD    Allergies: Codeine, Latex, Sulfa Antibiotics    Isolation: None   Infection: None   Code Status: CPR    Ht: 167.6 cm (66\")   Wt: 57.1 kg (125 lb 14.1 oz)    Admission Cmt: None   Principal Problem: CHF exacerbation [I50.9]                   Active Insurance as of 7/31/2024       Primary Coverage       Payor Plan Insurance Group Employer/Plan Group    MEDICARE MEDICARE A & B        Payor Plan Address Payor Plan Phone Number Payor Plan Fax Number Effective Dates    [address] [phone]  4/1/2004 - None Entered    [facility] [id number]         Subscriber Name Subscriber Birth Date Member ID       HELENA LUNA 1940 [id number]               Secondary Coverage       Payor Plan Insurance Group Employer/Plan Group    MISC [employer] SUP   [facility]                     Coverage Address Coverage Phone Number Coverage Fax Number Effective Dates    [address] [phone]  1/1/2013 - None Entered    [facility] [id number]         Subscriber Name Subscriber Birth Date Member ID       HELENA LUNA [id number]                     Emergency Contacts        (Rel.) Home Phone Work Phone Mobile Phone    DELMA MARTINEZ(DELA) (Other) -- -- [phone]                "

## 2024-08-05 NOTE — CONSULTS
INITIAL CONSULT NOTE      Patient Name: Andrea Luna  : 1940  MRN: [id number]  Primary Care Physician: Emily Barahona DO  Date of admission: 2024    Patient Care Team:  Emily Barahona DO as PCP - General (Family Medicine)        Reason for Consult:       Renal,volume management.  Subjective   History of Present Illness:   Chief Complaint:   Chief Complaint   Patient presents with    Hypotension     HISTORY:  Pt. Is an 84 year-old male patient with past medical history of anemia, arthritis, CAD, MI, frequent falls, hypertension, hyperlipidemia, heart murmur,        Nephrology consulted for acute kidney injury, baseline serum creatinine is 1.2, serum creatinine   now serum creatinine 1.1 today and 1.46 yesterday.  Patient is on furosemide 40 mg daily p.o. for volume management.                   Personal History:     Past Medical History:   Past Medical History:   Diagnosis Date    SRIKANTH (acute kidney injury)     Aortic valve stenosis     Cervical spinal stenosis     CHF (congestive heart failure)     Coronary artery disease     DDD (degenerative disc disease), cervical     Elevated cholesterol     Hyperlipidemia     Hypertension     Pneumonia        Surgical History:      Past Surgical History:   Procedure Laterality Date    CORONARY ARTERY BYPASS GRAFT         Family History: family history is not on file. Otherwise pertinent FHx was reviewed and unremarkable.     Social History:  reports that he has never smoked. He does not have any smokeless tobacco history on file. He reports that he does not currently use drugs. He reports that he does not drink alcohol.    Medications:  Prior to Admission medications    Medication Sig Start Date End Date Taking? Authorizing Provider   albuterol sulfate  (90 Base) MCG/ACT inhaler Inhale 2 puffs Every 4 (Four) Hours As Needed for Wheezing.    ProviderAbbe MD   aspirin 81 MG chewable tablet Chew 1 tablet Daily.    Abbe May MD    atorvastatin (LIPITOR) 40 MG tablet Take 1 tablet by mouth Every Night.    Abbe May MD   carvedilol (COREG) 6.25 MG tablet Take 1 tablet by mouth 2 (Two) Times a Day With Meals.    Abbe May MD   docusate sodium (COLACE) 100 MG capsule Take 1 capsule by mouth Daily.    Abbe May MD   Ferrous Sulfate 220 (44 Fe) MG/5ML oral solution Take 5 mL by mouth Daily.    Abbe May MD   furosemide (LASIX) 20 MG tablet Take 1 tablet by mouth 2 (Two) Times a Day.    Abbe May MD   lactulose (CHRONULAC) 10 GM/15ML solution Take 30 mL by mouth 2 (Two) Times a Day As Needed.    Abbe May MD   multivitamin with minerals tablet tablet Take 1 tablet by mouth Daily.    Abbe May MD   omeprazole (priLOSEC) 20 MG capsule Take 1 capsule by mouth Daily.    Abbe May MD   ondansetron (ZOFRAN) 4 MG tablet Take 1 tablet by mouth Every 4 (Four) Hours As Needed for Nausea or Vomiting.    Abbe May MD   polyethylene glycol (MiraLax) 17 g packet Take 17 g by mouth Daily.    Abbe May MD   potassium chloride (KLOR-CON M20) 20 MEQ CR tablet Take 1 tablet by mouth Daily.    Abbe May MD   sertraline (ZOLOFT) 25 MG tablet Take 1 tablet by mouth Daily.    Abbe Mya MD   traMADol (ULTRAM) 50 MG tablet Take 1 tablet by mouth Every Night.    Abbe May MD     Scheduled Meds:[Held by provider] aspirin, 81 mg, Oral, Daily  atorvastatin, 40 mg, Oral, Nightly  [Held by provider] carvedilol, 6.25 mg, Oral, BID With Meals  lisinopril, 2.5 mg, Oral, Q24H  multivitamin with minerals, 1 tablet, Oral, Daily  pantoprazole, 40 mg, Oral, Q AM  sertraline, 25 mg, Oral, Daily  sodium chloride, 10 mL, Intravenous, Q12H  traMADol, 50 mg, Oral, Nightly      Continuous Infusions:   PRN Meds:  acetaminophen **OR** acetaminophen **OR** acetaminophen    albuterol    senna-docusate sodium **AND** polyethylene glycol  **AND** bisacodyl **AND** bisacodyl    melatonin    nitroglycerin    ondansetron    Potassium Replacement - Follow Nurse / BPA Driven Protocol    sodium chloride    sodium chloride    sodium chloride  Allergies:    Allergies   Allergen Reactions    Codeine Rash    Latex Rash     Other reaction(s): ; 5/3/2006 11:36:20 AM    Sulfa Antibiotics Rash       Objective   Exam:     Vital Signs  Temp:  [97.6 °F (36.4 °C)-98.1 °F (36.7 °C)] 98.1 °F (36.7 °C)  Heart Rate:  [61-74] 74  Resp:  [12-18] 15  BP: (142-170)/(70-83) 165/82  SpO2:  [90 %-100 %] 100 %  on  Flow (L/min):  [1-2] 2;   Device (Oxygen Therapy): nasal cannula  Body mass index is 20.32 kg/m².  EXAM           Results Review:  I have personally reviewed most recent Data :  [facility]  CBC    Results from last 7 days   Lab Units 08/04/24  0406 08/03/24  0443 08/02/24  0306 08/01/24  0014   WBC 10*3/mm3 3.91 4.37 4.71 8.95   HEMOGLOBIN g/dL 9.8* 9.7* 9.2* 11.4*   PLATELETS 10*3/mm3 144 131* 113* 129*     CMP   Results from last 7 days   Lab Units 08/04/24  1020 08/03/24  0443 08/02/24  0306 08/01/24  0417 08/01/24  0014   SODIUM mmol/L 142 142 139 141 139   POTASSIUM mmol/L 3.1* 3.4* 3.4* 3.7 4.2   CHLORIDE mmol/L 100 104 103 106 104   CO2 mmol/L 35.8* 31.0* 28.7 25.5 24.4   BUN mg/dL 22 30* 31* 22 22   CREATININE mg/dL 1.11 1.46* 1.56* 1.26 1.41*   GLUCOSE mg/dL 110* 86 88 85 86   ALBUMIN g/dL 3.4*  --   --   --  3.5   BILIRUBIN mg/dL 0.4  --   --   --  0.7   ALK PHOS U/L 139*  --   --   --  149*   AST (SGOT) U/L 27  --   --   --  32   ALT (SGPT) U/L 13  --   --   --  10     ABG    Results from last 7 days   Lab Units 08/01/24  0014   PH, ARTERIAL pH units 7.339*   PCO2, ARTERIAL mm Hg 48.9*   PO2 ART mm Hg 103.4   O2 SATURATION ART % 97.5   BASE EXCESS ART mmol/L 0.2     CT Abdomen Pelvis With & Without Contrast    Result Date: 8/2/2024  Impression: 1. Indeterminate 1.9 cm lesion at the lateral cortical margin of the right mid kidney. Given its  stability and size and morphology since the CT abdomen of 1/6/2020, it would favor a benign etiology. However, on today's examination, it does appear to demonstrate contrast enhancement, which would favor malignant etiology. Any further decision as to whether to continue imaging surveillance versus soft tissue biopsy should be based upon clinical assessment for this 84-year-old patient. 2. Bilateral renal cysts. 3. Heavy calcific atherosclerosis within the bilateral renal arteries, left greater than right. Left renal artery stenosis not excluded. 4.. Mild to moderate left renal atrophy with multifocal cortical scarring. 5. Tiny nonobstructing right renal stone. 6. Moderate right pleural effusion with right basilar atelectasis. Chronic left basilar pleural effusion and chronic rounded to left basilar atelectasis. 7. Sigmoid colonic diverticulosis. Electronically Signed: Abigail William MD  8/2/2024 10:29 AM EDT  Workstation ID: [id number]    CT Head Without Contrast    Result Date: 8/1/2024  Impression: 1.No acute intracranial abnormality. 2.Stable mild to moderate chronic small vessel ischemic change and generalized parenchymal volume loss. 3.Mild paranasal sinus mucosal disease status post antrostomy. Electronically Signed: Sadiq Reza MD  8/1/2024 1:22 AM EDT  Workstation ID: [id number]    XR Chest 1 View    Result Date: 8/1/2024  Impression: Worsening moderate pulmonary edema with stable small bilateral pleural effusions. Electronically Signed: Sadiq Reza MD  8/1/2024 12:58 AM EDT  Workstation ID: [id number]     Results for orders placed during the hospital encounter of 07/31/24    Adult Transthoracic Echo Complete W/ Cont if Necessary Per Protocol    Interpretation Summary    Left ventricular systolic function is normal. Left ventricular ejection fraction appears to be 31 - 35%.    The left ventricular cavity is borderline dilated.    Left ventricular wall thickness is consistent with mild concentric  hypertrophy.    Left ventricular diastolic function is consistent with (grade II w/high LAP) pseudonormalization.    Mildly reduced right ventricular systolic function noted.    The right ventricular cavity is borderline dilated.    The left atrial cavity is severely dilated.    Left atrial volume is severely increased.    The right atrial cavity is severely  dilated.    Severe aortic valve stenosis is present.    Abnormal mitral valve structure consistent with dilated annulus.    Moderate to severe mitral valve regurgitation is present with an eccentric jet noted.    Estimated right ventricular systolic pressure from tricuspid regurgitation is mildly elevated (35-45 mmHg).    Mild pulmonary hypertension is present.        Assessment & Plan   Assessment and Plan:         CHF exacerbation    CAD (coronary artery disease)    Depression, unspecified    Heart failure, unspecified    Chronic obstructive pulmonary disease, unspecified    Severe malnutrition      Acute kidney injury, baseline serum creatinine is 1.2, creatinine in outlying hospital was 2.1, scr now is 1.68, 147, EGFR 40.  On the background history of of poor oral intake, hypotension, and COVID infection, worsening of renal function multifactorial, possible ATN.  Renal US: Right kidney 9.1 cm, left kidney 7.5 cm, simple cyst of both kidneys, no masses no calculi, no hydronephrosis,  UA unremarkable  Serology reviewed: ORTEGA screen negative, normal complements, free kappa 38, free lambda 18, KL ratio 2.0, no monoclonal protein detected  History of hypertension  History of CAD  History of frequent falls  COVID-19 viral infection  Hyperlipidemia     EF 45 to 50%, mild left ventricular hypertrophy, mild dilation of left atrium, mild to moderate mitral regurgitation, mild aortic stenosis.         PLAN:                                                                             SRIKANTH with baseline sCr 1.2, creatinine 1.4 yesterday likely hemodynamic mediated.  Agree  with holding lasix.evaluate volume daily.  Potassium low, magnesium stable.    BP trends acceptable  TTE shows LVEF 45% with moderate to severe aortic stenosis: Avoid very aggressive diuresis.   H/o COVID-19  positive 2023.  Avoid nephrotoxic agent  We will follow closely       Dillan Degroot MD  [facility]  8/4/2024  21:50 EDT

## 2024-08-05 NOTE — THERAPY TREATMENT NOTE
"Subjective: Pt agreeable to therapeutic plan of care.  Cognition: arousal/alertness: Alert and Attentive    Objective:     Precautions - High Fall Risk    Bed Mobility: Modified-Independent, bed rails  Functional Transfers: Min-A and with rolling walker  Balance: with UE support and standing CGA  Functional Ambulation: CGA and with rolling walker    Vitals: Desaturates, 2 L O2 NC at 100%, titrated to RA upon transition to sitting EOB pt desats to 88, titrated back to 2L O2 NC to 100% within 1 min. Desats with min exertion.    Pain: 0 VAS  Location: N/A  Interventions for pain: N/A  Education: Provided education on the importance of mobility in the acute care setting, Verbal/Tactile Cues, ADL training, Transfer Training, and Energy conservation strategies    Assessment: Andrea Luna presents with ADL impairments affecting function including balance, coordination, endurance / activity tolerance, grasp, shortness of breath, and strength. Pt alert and attentive, motivated and agreeable to ambulate with therapist this pm. Pt completed bed mobility with mod I and min assist with FWW to come to standing. Pt ambulated short household distances with CGA and FWW, however requires increased time and verbal cues for longer stride and heel to toe strikes. Pt noted with difficult manipulating/positioning  FWW as he kept bumping L foot into walker. Demonstrated functioning below baseline abilities indicate the need for continued skilled intervention while inpatient. Tolerating session today without incident. Will continue to follow and progress as tolerated.     Plan/Recommendations:   Moderate Intensity Therapy recommended post-acute care. This is recommended as therapy feels the patient would require 3-4 days per week and wouldn't tolerate \"3 hour daily\" rehab intensity. SNF would be the preferred choice. If the patient does not agree to SNF, arrange HH or OP depending on home bound status. If patient is medically complex, " consider LTACH.    Pt desires Skilled Rehab placement at discharge. Pt cooperative; agreeable to therapeutic recommendations and plan of care.     Modified [place]: N/A = No pre-op stroke/TIA    Post-Tx Position: Up in Chair, Alarms activated, and Call light and personal items within reach  PPE: gloves

## 2024-08-05 NOTE — PROGRESS NOTES
PROGRESS NOTE      Patient Name: Andrea Luna  : 1940  MRN: [id number]  Primary Care Physician: Emily Barahona DO  Date of admission: 2024    Patient Care Team:  Emily Barahona DO as PCP - General (Family Medicine)        Subjective   Subjective:     Comfortable.  Review of systems:  Negative.      Allergies:    Allergies   Allergen Reactions    Codeine Rash    Latex Rash     Other reaction(s): ; 5/3/2006 11:36:20 AM    Sulfa Antibiotics Rash       Objective   Exam:     Vital Signs  Temp:  [97.6 °F (36.4 °C)-98.1 °F (36.7 °C)] 97.7 °F (36.5 °C)  Heart Rate:  [61-74] 63  Resp:  [13-18] 15  BP: (146-165)/(78-84) 146/78  SpO2:  [99 %-100 %] 100 %  on  Flow (L/min):  [1-2] 2;   Device (Oxygen Therapy): nasal cannula  Body mass index is 20.32 kg/m².    General:    male in no acute distress.    Head:      Normocephalic and atraumatic.    Eyes:      PERRL/EOM intact, conjunctiva and sclera clear with out nystagmus.    Neck:      No masses, thyromegaly,  trachea central with normal respiratory effort   Lungs:    Clear bilaterally to auscultation.    Heart:      Regular rate and rhythm, no murmur no gallop  Abd:        Soft, nontender, not distended, bowel sounds positive, no shifting dullness   Pulses:   Pulses palpable  Extr:        No cyanosis or clubbing--+edema.    Neuro:    No focal deficits.   alert oriented x3  Skin:       Intact without lesions or rashes.    Psych:    Alert and cooperative; normal mood and affect; .      Results Review:  I have personally reviewed most recent Data :  CBC    Results from last 7 days   Lab Units 24  0028 24  0406 24  0443 24  0306 24  0014   WBC 10*3/mm3 3.59 3.91 4.37 4.71 8.95   HEMOGLOBIN g/dL 8.9* 9.8* 9.7* 9.2* 11.4*   PLATELETS 10*3/mm3 134* 144 131* 113* 129*     CMP   Results from last 7 days   Lab Units 24  0028 24  1020 24  0443 24  0306 24  0417 24  0014   SODIUM mmol/L 143 142 142  139 141 139   POTASSIUM mmol/L 4.4 3.1* 3.4* 3.4* 3.7 4.2   CHLORIDE mmol/L 103 100 104 103 106 104   CO2 mmol/L 34.2* 35.8* 31.0* 28.7 25.5 24.4   BUN mg/dL 24* 22 30* 31* 22 22   CREATININE mg/dL 1.12 1.11 1.46* 1.56* 1.26 1.41*   GLUCOSE mg/dL 113* 110* 86 88 85 86   ALBUMIN g/dL  --  3.4*  --   --   --  3.5   BILIRUBIN mg/dL  --  0.4  --   --   --  0.7   ALK PHOS U/L  --  139*  --   --   --  149*   AST (SGOT) U/L  --  27  --   --   --  32   ALT (SGPT) U/L  --  13  --   --   --  10     ABG    Results from last 7 days   Lab Units 08/01/24  0014   PH, ARTERIAL pH units 7.339*   PCO2, ARTERIAL mm Hg 48.9*   PO2 ART mm Hg 103.4   O2 SATURATION ART % 97.5   BASE EXCESS ART mmol/L 0.2     No radiology results for the last day    Results for orders placed during the hospital encounter of 07/31/24    Adult Transthoracic Echo Complete W/ Cont if Necessary Per Protocol    Interpretation Summary    Left ventricular systolic function is normal. Left ventricular ejection fraction appears to be 31 - 35%.    The left ventricular cavity is borderline dilated.    Left ventricular wall thickness is consistent with mild concentric hypertrophy.    Left ventricular diastolic function is consistent with (grade II w/high LAP) pseudonormalization.    Mildly reduced right ventricular systolic function noted.    The right ventricular cavity is borderline dilated.    The left atrial cavity is severely dilated.    Left atrial volume is severely increased.    The right atrial cavity is severely  dilated.    Severe aortic valve stenosis is present.    Abnormal mitral valve structure consistent with dilated annulus.    Moderate to severe mitral valve regurgitation is present with an eccentric jet noted.    Estimated right ventricular systolic pressure from tricuspid regurgitation is mildly elevated (35-45 mmHg).    Mild pulmonary hypertension is present.    Scheduled Meds:[Held by provider] aspirin, 81 mg, Oral, Daily  atorvastatin, 40 mg,  Oral, Nightly  [Held by provider] carvedilol, 6.25 mg, Oral, BID With Meals  lisinopril, 2.5 mg, Oral, Q24H  multivitamin with minerals, 1 tablet, Oral, Daily  pantoprazole, 40 mg, Oral, Q AM  sertraline, 25 mg, Oral, Daily  sodium chloride, 10 mL, Intravenous, Q12H  traMADol, 50 mg, Oral, Nightly      Continuous Infusions:   PRN Meds:  acetaminophen **OR** acetaminophen **OR** acetaminophen    albuterol    senna-docusate sodium **AND** polyethylene glycol **AND** bisacodyl **AND** bisacodyl    melatonin    nitroglycerin    ondansetron    Potassium Replacement - Follow Nurse / BPA Driven Protocol    sodium chloride    sodium chloride    sodium chloride    Assessment & Plan   Assessment and Plan:         CHF exacerbation    CAD (coronary artery disease)    Depression, unspecified    Heart failure, unspecified    Chronic obstructive pulmonary disease, unspecified    Severe malnutrition    ASSESSMENT:  Acute kidney injury, baseline serum creatinine is 1.2, creatinine in outlying hospital was 2.1, scr now is 1.68, 147, EGFR 40.  On the background history of of poor oral intake, hypotension, and COVID infection, worsening of renal function multifactorial, possible ATN.  Renal US: Right kidney 9.1 cm, left kidney 7.5 cm, simple cyst of both kidneys, no masses no calculi, no hydronephrosis,  UA unremarkable  Serology reviewed: ORTEGA screen negative, normal complements, free kappa 38, free lambda 18, KL ratio 2.0, no monoclonal protein detected  History of hypertension  History of CAD  History of frequent falls  COVID-19 viral infection 2023  Hyperlipidemia     EF 45 to 50%, mild left ventricular hypertrophy, mild dilation of left atrium, mild to moderate mitral regurgitation, mild aortic stenosis.         PLAN:                                                                             SRIKANTH with baseline sCr 1.2, creatinine 1.4 yesterday likely hemodynamic mediated.  Agree with holding lasix.evaluate volume daily.likely  restart tomorrow.  Potassium low, magnesium stable.    BP trends acceptable  TTE shows LVEF 45% with moderate to severe aortic stenosis: Avoid very aggressive diuresis.   H/o COVID-19  positive 2023.  Avoid nephrotoxic agent  We will follow closely              Electronically signed by Dillan Degroot MD,   [facility] kidney consultant  [phone]  8/5/2024  07:46 EDT

## 2024-08-05 NOTE — PLAN OF CARE
"Assessment: Andrea Luna presents with ADL impairments affecting function including balance, coordination, endurance / activity tolerance, grasp, shortness of breath, and strength. Pt alert and attentive, motivated and agreeable to ambulate with therapist this pm. Pt completed bed mobility with mod I and min assist with FWW to come to standing. Pt ambulated short household distances with CGA and FWW, however requires increased time and verbal cues for longer stride and heel to toe strikes. Pt noted with difficult manipulating/positioning  FWW as he kept bumping L foot into walker. Demonstrated functioning below baseline abilities indicate the need for continued skilled intervention while inpatient. Tolerating session today without incident. Will continue to follow and progress as tolerated.      Plan/Recommendations:   Moderate Intensity Therapy recommended post-acute care. This is recommended as therapy feels the patient would require 3-4 days per week and wouldn't tolerate \"3 hour daily\" rehab intensity. SNF would be the preferred choice. If the patient does not agree to SNF, arrange HH or OP depending on home bound status. If patient is medically complex, consider LTACH.  "

## 2024-08-05 NOTE — PROGRESS NOTES
Cardiology [facility]        LOS:  LOS: 2 days   Patient Name: Andrea Luna  Age/Sex: 84 y.o. male  : 1940  MRN: [id number]    Day of Service: 24   Length of Stay: 2  Encounter Provider: COLE Esteves  Place of Service: [facility] CARDIOLOGY  Patient Care Team:  Emily Barahona DO as PCP - General (Family Medicine)    Subjective:     Chief Complaint: f/u HF    Subjective: patient more alert. He has no acute complaints. His edema is resolved. He is on 2L NC  Sinus bradycardia, HR in the 50s.    Current Medications:   Scheduled Meds:[Held by provider] aspirin, 81 mg, Oral, Daily  atorvastatin, 40 mg, Oral, Nightly  carvedilol, 6.25 mg, Oral, BID With Meals  lisinopril, 2.5 mg, Oral, Q24H  multivitamin with minerals, 1 tablet, Oral, Daily  pantoprazole, 40 mg, Oral, Q AM  sertraline, 25 mg, Oral, Daily  sodium chloride, 10 mL, Intravenous, Q12H  traMADol, 50 mg, Oral, Nightly      Continuous Infusions:     Allergies:  Allergies   Allergen Reactions    Codeine Rash    Latex Rash     Other reaction(s): ; 5/3/2006 11:36:20 AM    Sulfa Antibiotics Rash       Review of Systems   Constitutional: Negative for chills, diaphoresis and malaise/fatigue.   Cardiovascular:  Negative for chest pain, dyspnea on exertion, irregular heartbeat, leg swelling, near-syncope, orthopnea, palpitations, paroxysmal nocturnal dyspnea and syncope.   Respiratory:  Negative for cough, shortness of breath, sleep disturbances due to breathing and sputum production.    Gastrointestinal:  Negative for change in bowel habit.   Genitourinary:  Negative for urgency.   Neurological:  Negative for dizziness and headaches.   Psychiatric/Behavioral:  Negative for altered mental status.          Objective:     Temp:  [97.5 °F (36.4 °C)-98.1 °F (36.7 °C)] 97.5 °F (36.4 °C)  Heart Rate:  [57-74] 57  Resp:  [13-17] 17  BP: (146-165)/(78-84) 153/82     Intake/Output Summary (Last 24 hours) at 2024 1338  Last  "data filed at 8/5/2024 0828  Gross per 24 hour   Intake 690 ml   Output 1690 ml   Net -1000 ml     Body mass index is 20.32 kg/m².      08/01/24  0700 08/01/24  2214 08/03/24  1700   Weight: 57.6 kg (127 lb) 59.9 kg (132 lb 0.9 oz) 57.1 kg (125 lb 14.1 oz)         General Appearance:    Alert, cooperative, in no acute distress                                Head: Atraumatic, normocephalic, PERRLA               Neck:   supple, trachea midline, no thyromegaly, no carotid bruit, no JVD   Lungs:    Supplemental O2, dim bilat bases    Heart:    Regular rhythm and normal rate, harsh blowing murmur   Abdomen:     Normal bowel sounds, no masses, no organomegaly, soft  nontender, nondistended, no guarding, no rebound  tenderness   Extremities:   Moves all extremities well, no edema, no cyanosis, no  redness   Pulses:   Pulses palpable and equal bilaterally   Skin:   No bleeding, bruising or rash   Neurologic:   Awake, alert, oriented x3         Lab Review:   Results from last 7 days   Lab Units 08/05/24  0028 08/04/24  1020 08/01/24  0417 08/01/24  0014   SODIUM mmol/L 143 142   < > 139   POTASSIUM mmol/L 4.4 3.1*   < > 4.2   CHLORIDE mmol/L 103 100   < > 104   CO2 mmol/L 34.2* 35.8*   < > 24.4   BUN mg/dL 24* 22   < > 22   CREATININE mg/dL 1.12 1.11   < > 1.41*   GLUCOSE mg/dL 113* 110*   < > 86   CALCIUM mg/dL 8.7 9.0   < > 9.1   AST (SGOT) U/L  --  27  --  32   ALT (SGPT) U/L  --  13  --  10    < > = values in this interval not displayed.     Results from last 7 days   Lab Units 08/01/24  0147 08/01/24  0014   HSTROP T ng/L 137* 110*     Results from last 7 days   Lab Units 08/05/24  0028 08/04/24  0406   WBC 10*3/mm3 3.59 3.91   HEMOGLOBIN g/dL 8.9* 9.8*   HEMATOCRIT % 30.1* 32.8*   PLATELETS 10*3/mm3 134* 144                   Invalid input(s): \"LDLCALC\"  Results from last 7 days   Lab Units 08/01/24  0014   PROBNP pg/mL 21,470.0*           Recent Radiology:  Imaging Results (Most Recent)       Procedure Component Value " Units Date/Time    CT Abdomen Pelvis With & Without Contrast [[id number]] Collected: 08/02/24 1018     Updated: 08/02/24 1031    Narrative:      CT ABDOMEN PELVIS W WO CONTRAST    Date of Exam: 8/2/2024 9:57 AM EDT    Indication: S hematuria.    Comparison: Renal ultrasound 4/20/2024. CT abdomen and pelvis with contrast 1/6/2020.    Technique: Axial CT images were obtained of the abdomen and pelvis before and after the uneventful intravenous administration of iodinated contrast. Sagittal and coronal reconstructions were performed.  Automated exposure control and iterative   reconstruction methods were used.      Findings:  No left renal stone is seen. Benign left renal hilar vascular calcification and benign left renal cortical calcifications are present. There is a punctate nonobstructing stone within the right mid kidney. No ureteral stone is identified.    A 1.9 cm hyperdense lesion is seen in the lateral cortex of the right mid kidney (noncontrast Hounsfield unit 73.6). It appears to demonstrate contrast enhancement (corticomedullary phase postcontrast Hounsfield units 89.6, delayed 5-minute phase of   postcontrast Hounsfield units of 88.6), elevating index of suspicion for potential malignancy. However, the 1/6/2020 CT abdomen demonstrates a similar size and shape hyperdense lesion in the same location.    Simple bilateral renal cysts are present, largest at the lower pole measuring 3.7 cm. There is multifocal left renal cortical scarring and mild to moderate left renal atrophy.    The urinary bladder is within normal limits.    The liver, spleen, adrenals are within normal limits. Uncomplicated cholelithiasis. Heavy calcific atherosclerosis within the abdominal aorta, proximal bilateral renal arteries. Pancreas is mildly atrophic with signs of chronic calcific pancreatitis.    The stomach, and the large and small bowel, do not appear abnormally thickened, dilated or inflamed. Advanced uncomplicated diverticular  changes are present of the sigmoid colon. There is small pelvic ascites. There is generalized body wall edema.    Moderate right and small left pleural effusions are present. The left pleural effusion appears chronic, loculated, with wall calcification. There is chronic rounded atelectasis within the left lower lobe. There is mild atelectasis in the right middle   lobe and right lower lobe.    Heart size is moderately enlarged. Coronary artery calcifications are present. Median sternotomy changes are seen. There is a mild aortic valve calcification.    Right hip replacement changes are noted. Lumbar levoscoliosis. Degenerative disc endplate changes and facet arthropathy within the spine. No acute or suspicious osseous abnormalities.        Impression:      Impression:    1. Indeterminate 1.9 cm lesion at the lateral cortical margin of the right mid kidney. Given its stability and size and morphology since the CT abdomen of 1/6/2020, it would favor a benign etiology. However, on today's examination, it does appear to   demonstrate contrast enhancement, which would favor malignant etiology. Any further decision as to whether to continue imaging surveillance versus soft tissue biopsy should be based upon clinical assessment for this 84-year-old patient.  2. Bilateral renal cysts.  3. Heavy calcific atherosclerosis within the bilateral renal arteries, left greater than right. Left renal artery stenosis not excluded.  4.. Mild to moderate left renal atrophy with multifocal cortical scarring.  5. Tiny nonobstructing right renal stone.  6. Moderate right pleural effusion with right basilar atelectasis. Chronic left basilar pleural effusion and chronic rounded to left basilar atelectasis.  7. Sigmoid colonic diverticulosis.        Electronically Signed: Abigail William MD    8/2/2024 10:29 AM EDT    Workstation ID: [id number]    CT Head Without Contrast [[id number]] Collected: 08/01/24 0120     Updated: 08/01/24 0124     Narrative:      CT HEAD WO CONTRAST    Date of Exam: 8/1/2024 1:00 AM EDT    Indication: ams.    Comparison: 11/27/2023.    Technique: Axial CT images were obtained of the head without contrast administration.  Coronal reconstructions were performed.  Automated exposure control and iterative reconstruction methods were used.      Findings:  Superficial soft tissues appear within normal limits. Mild left ethmoid sinus and left maxillary sinus mucosal disease status post antrostomy. Mastoids are clear. Paranasal sinuses and mastoid air cells appear well aerated.  Orbits are unremarkable.    There is no acute intracranial hemorrhage.  No mass effect or midline shift.  No abnormal extra-axial collections.  Gray-white differentiation is within normal limits. Stable patchy white matter hypoattenuation; mild to moderate in magnitude. There is   generalized parenchymal volume loss congruent with age. There are small chronic lacunar infarctions in the basal ganglia. Ventricular size and configuration is normal for age.      Impression:      Impression:  1.No acute intracranial abnormality.  2.Stable mild to moderate chronic small vessel ischemic change and generalized parenchymal volume loss.  3.Mild paranasal sinus mucosal disease status post antrostomy.        Electronically Signed: Sadiq Reza MD    8/1/2024 1:22 AM EDT    Workstation ID: [id number]    XR Chest 1 View [[id number]] Collected: 08/01/24 0057     Updated: 08/01/24 0100    Narrative:      XR CHEST 1 VW    Date of Exam: 8/1/2024 12:20 AM EDT    Indication: ams    Comparison: 6/27/2024    Findings:  There is worsened moderate pulmonary edema. There are stable small small bilateral pleural effusions. There is evidence of prior median sternotomy and CABG. No pneumothorax. Pulmonary vasculature is indistinct. Cardiac silhouette is partially obscured,   but likely unchanged from the prior study. No acute osseous abnormality.      Impression:       Impression:  Worsening moderate pulmonary edema with stable small bilateral pleural effusions.        Electronically Signed: Sadiq Reza MD    8/1/2024 12:58 AM EDT    Workstation ID: [id number]            ECHOCARDIOGRAM:    Results for orders placed during the hospital encounter of 07/31/24    Adult Transthoracic Echo Complete W/ Cont if Necessary Per Protocol    Interpretation Summary    Left ventricular systolic function is normal. Left ventricular ejection fraction appears to be 31 - 35%.    The left ventricular cavity is borderline dilated.    Left ventricular wall thickness is consistent with mild concentric hypertrophy.    Left ventricular diastolic function is consistent with (grade II w/high LAP) pseudonormalization.    Mildly reduced right ventricular systolic function noted.    The right ventricular cavity is borderline dilated.    The left atrial cavity is severely dilated.    Left atrial volume is severely increased.    The right atrial cavity is severely  dilated.    Severe aortic valve stenosis is present.    Abnormal mitral valve structure consistent with dilated annulus.    Moderate to severe mitral valve regurgitation is present with an eccentric jet noted.    Estimated right ventricular systolic pressure from tricuspid regurgitation is mildly elevated (35-45 mmHg).    Mild pulmonary hypertension is present.        I reviewed the patient's new clinical results.    EKG:      Assessment:       CHF exacerbation    CAD (coronary artery disease)    Depression, unspecified    Heart failure, unspecified    Chronic obstructive pulmonary disease, unspecified    Severe malnutrition    Expand All Collapse All    Cardiology consult note  Naveed Morin MD, PhD  8-1-2024           Referring Provider: Brad Alicea MD     Reason for Consultation:       Heart failure  Elevated troponin  Mental status changes        Patient Care Team:  Emily Barahona DO as PCP - General (Family Medicine)     Seen and  examined agree with narrative as discussed with nurse practitioner after face-to-face encounter scruff findings below greater than 50% of total encounter time and medical decision making performed by me        SUBJECTIVE      Chief Complaint: Mental status changes     History of present illness:  Andrea Luna is a 84 y.o. male with a history of coronary artery disease who presented to [facility] with complaint of mental status changes from his facility.     Patient is a poor historian and really unable to provide me any details about his medical history.  Information is obtained from reviewing his chart.  There is no family present.     Patient lives in a long-term care facility.  It is reported that he had altered mental status, hypotension and bradycardia and was transferred to the emergency room for further evaluation.     In the emergency room the patient's blood pressure was reported to be 70/40.  He was given IV normal saline.  Heart rate was reported to be in the 40s.     Evaluation in the emergency room includes high-sensitivity troponin times 2/1/2010, 137 proBNP 21,470.  BUN and creatinine 22 and 1.2.  Potassium 3.7 lactic was 1.6 white blood cell count 8.9 hemoglobin 11.4 hematocrit 37.1 platelets 129 blood cultures are pending.  COVID 19 swab was negative.  CT of his head showed no acute intracranial abnormalities.  Chest x-ray showed moderate pulmonary edema with small bilateral pleural effusions.  EKG on admission showed sinus rhythm with a heart rate of 73.  LVH.  No acute ST or T wave segment abnormalities     Patient is an 84-year-old gentleman who routinely follows with cardiology.  He is seen by [place] heart specialist typically Dr. Hollingsworth.     Review of his medical records show that the patient has a history of coronary artery disease with remote CABG in 2003.  Patient is known to have hypertension, dyslipidemia, aortic stenosis.  Last echocardiogram was reported to have EF of 45 to  50% with moderate aortic stenosis.     2D echo today reviewed and interpreted by me demonstrates an EF of around 30% with regional abnormalities with akinesis of the posterior wall inferolateral wall and high lateral wall, mild LV dilation, likely low gradient severe aortic valve stenosis with moderate to severe eccentric jet of mitral valve vegetation and dilated mitral valve annulus with tethered leaflets given LV enlargement.  RV appears mildly hypokinetic globally, no mass or effusion seen, elevated pulmonary pressures with mild pulm hypertension, IVC is dilated consistent with volume overload right atrial pressure estimated 15-20     Patient has had post CABG PCI to the RCA.     Last catheterization is reported to have 100% native LAD occlusion, 100% native left circumflex occlusion, 30% RCA stenosis.  LIMA to the LAD was patent: saphenous vein graft to the diagonal is patent, saphenous vein graft to the OM was patent.  EF by LV gram was 40 to 45%     Historical data copied forward from previous encounters in EMR is unchanged     Review of systems otherwise negative x 14 point review of systems except as mentioned above  Hard of hearing  Limited history obtained, patient saying he cannot remember some of his medical history but he knows Dr. Hollingsworth takes care of him              Personal History:       Medical History        Past Medical History:   Diagnosis Date    SRIKANTH (acute kidney injury)      Aortic valve stenosis      Cervical spinal stenosis      CHF (congestive heart failure)      Coronary artery disease      DDD (degenerative disc disease), cervical      Elevated cholesterol      Hyperlipidemia      Hypertension      Pneumonia              Surgical History         Past Surgical History:   Procedure Laterality Date    CORONARY ARTERY BYPASS GRAFT                History reviewed. No pertinent family history.     Social History   Social History           Tobacco Use    Smoking status: Never   Vaping Use     Vaping status: Never Used   Substance Use Topics    Alcohol use: Never    Drug use: Not Currently            Home meds:          Prior to Admission medications    Medication Sig Start Date End Date Taking? Authorizing Provider   albuterol sulfate  (90 Base) MCG/ACT inhaler Inhale 2 puffs Every 4 (Four) Hours As Needed for Wheezing.       Abbe May MD   aspirin 81 MG chewable tablet Chew 1 tablet Daily.       Abbe May MD   atorvastatin (LIPITOR) 40 MG tablet Take 1 tablet by mouth Every Night.       Abbe May MD   carvedilol (COREG) 6.25 MG tablet Take 1 tablet by mouth 2 (Two) Times a Day With Meals.       Abbe May MD   docusate sodium (COLACE) 100 MG capsule Take 1 capsule by mouth Daily.       Abbe May MD   Ferrous Sulfate 220 (44 Fe) MG/5ML oral solution Take 5 mL by mouth Daily.       Abbe May MD   furosemide (LASIX) 20 MG tablet Take 1 tablet by mouth 2 (Two) Times a Day.       Abbe May MD   lactulose (CHRONULAC) 10 GM/15ML solution Take 30 mL by mouth 2 (Two) Times a Day As Needed.       Abbe May MD   multivitamin with minerals tablet tablet Take 1 tablet by mouth Daily.       Abbe May MD   omeprazole (priLOSEC) 20 MG capsule Take 1 capsule by mouth Daily.       Abbe May MD   ondansetron (ZOFRAN) 4 MG tablet Take 1 tablet by mouth Every 4 (Four) Hours As Needed for Nausea or Vomiting.       Abbe Mya MD   polyethylene glycol (MiraLax) 17 g packet Take 17 g by mouth Daily.       Abbe May MD   potassium chloride (KLOR-CON M20) 20 MEQ CR tablet Take 1 tablet by mouth Daily.       Abbe May MD   sertraline (ZOLOFT) 25 MG tablet Take 1 tablet by mouth Daily.       Abbe May MD   traMADol (ULTRAM) 50 MG tablet Take 1 tablet by mouth Every Night.       Abbe May MD         Allergies:     Codeine, Latex, and Sulfa  "antibiotics     Scheduled Meds:  Scheduled Medication   [START ON 8/2/2024] aspirin, 81 mg, Oral, Daily  atorvastatin, 40 mg, Oral, Nightly  carvedilol, 6.25 mg, Oral, BID With Meals  furosemide, 20 mg, Intravenous, Q6H  multivitamin with minerals, 1 tablet, Oral, Daily  pantoprazole, 40 mg, Oral, Q AM  sertraline, 25 mg, Oral, Daily  sodium chloride, 10 mL, Intravenous, Q12H  traMADol, 50 mg, Oral, Nightly         Continuous Infusions:  Infusion Medications         PRN Meds:  PRN Medication     acetaminophen **OR** acetaminophen **OR** acetaminophen    albuterol    senna-docusate sodium **AND** polyethylene glycol **AND** bisacodyl **AND** bisacodyl    melatonin    nitroglycerin    ondansetron    sodium chloride    sodium chloride    sodium chloride           OBJECTIVE     Vital Signs  Vitals          Vitals:     08/01/24 0316 08/01/24 0416 08/01/24 0447 08/01/24 0700   BP: 133/72 103/67   120/61   BP Location:       Right arm   Patient Position:       Lying   Pulse: 69 66   61   Resp:       12   Temp:       96.4 °F (35.8 °C)   TempSrc:       Oral   SpO2: 100% 97%   98%   Weight:     57.7 kg (127 lb 3.3 oz)     Height:                    Flowsheet Rows       Flowsheet Row First Filed Value   Admission Height 170.2 cm (67\") Documented at 07/31/2024 2330   Admission Weight 57.7 kg (127 lb 3.3 oz) Documented at 08/01/2024 0447                   Intake/Output Summary (Last 24 hours) at 8/1/2024 0915  Last data filed at 8/1/2024 0500      Gross per 24 hour   Intake 260 ml   Output --   Net 260 ml          Telemetry: Sinus rhythm     Physical Exam:  The patient is alert, oriented and in no distress.  Frail in appearance, nasal cannula  Vital signs as noted above.  Head and neck revealed no carotid bruits, positive JVD HJR  Lungs clear.  Diminished in the bases  Heart: Normal first and second heart sounds.  Systolic murmur present.   Abdomen: Soft and nontender.  No organomegaly is present.  Extremities with good " peripheral pulses, positive lower extremity edema especially posteriorly with significant pitting 1-2+  Skin: Warm and dry.  Musculoskeletal system is grossly normal.  CNS grossly normal.  No gross deficits        Results Review:  I have personally reviewed the results from the time of this admission to 8/1/2024 09:15 EDT and agree with these findings:  []  Laboratory  []  Microbiology  []  Radiology  []  EKG/Telemetry   []  Cardiology/Vascular   []  Pathology  []  Old records  []  Other:     Most notable findings include:      Lab Results (last 24 hours)         Procedure Component Value Units Date/Time     Basic Metabolic Panel [[id number]]  (Abnormal) Collected: 08/01/24 0417     Specimen: Blood Updated: 08/01/24 0453       Glucose 85 mg/dL         BUN 22 mg/dL         Creatinine 1.26 mg/dL         Sodium 141 mmol/L         Potassium 3.7 mmol/L         Comment: Specimen hemolyzed.  Result may be falsely elevated.          Chloride 106 mmol/L         CO2 25.5 mmol/L         Calcium 8.1 mg/dL         BUN/Creatinine Ratio 17.5       Anion Gap 9.5 mmol/L         eGFR 55.9 mL/min/1.73       Narrative:       GFR Normal >60  Chronic Kidney Disease <60  Kidney Failure <15     The GFR formula is only valid for adults with stable renal function between ages 18 and 70.     Urinalysis, Microscopic Only - Straight Cath [[id number]]  (Abnormal) Collected: 08/01/24 0207     Specimen: Urine from Straight Cath Updated: 08/01/24 0224       RBC, UA Too Numerous to Count /HPF         WBC, UA 3-5 /HPF         Comment: Urine culture not indicated.          Bacteria, UA None Seen /HPF         Squamous Epithelial Cells, UA 3-6 /HPF         Hyaline Casts, UA 3-6 /LPF         Methodology Manual Light Microscopy     High Sensitivity Troponin T 2Hr [[id number]]  (Abnormal) Collected: 08/01/24 0147     Specimen: Blood Updated: 08/01/24 0221       HS Troponin T 137 ng/L         Troponin T Delta 27 ng/L       Narrative:       High Sensitive  Troponin T Reference Range:  <14.0 ng/L- Negative Female for AMI  <22.0 ng/L- Negative Male for AMI  >=14 - Abnormal Female indicating possible myocardial injury.  >=22 - Abnormal Male indicating possible myocardial injury.   Clinicians would have to utilize clinical acumen, EKG, Troponin, and serial changes to determine if it is an Acute Myocardial Infarction or myocardial injury due to an underlying chronic condition.           Urinalysis With Culture If Indicated - Straight Cath [[id number]]  (Abnormal) Collected: 08/01/24 0207     Specimen: Urine from Straight Cath Updated: 08/01/24 0215       Color, UA Dark Yellow       Appearance, UA Clear       pH, UA 5.5       Specific Gravity, UA 1.013       Glucose, UA Negative       Ketones, UA Trace       Bilirubin, UA Negative       Blood, UA Large (3+)       Protein,  mg/dL (2+)       Leuk Esterase, UA Trace       Nitrite, UA Negative       Urobilinogen, UA 1.0 E.U./dL     Narrative:       In absence of clinical symptoms, the presence of pyuria, bacteria, and/or nitrites on the urinalysis result does not correlate with infection.     Blood Culture - Blood, Arm, Left [[id number]] Collected: 08/01/24 0147     Specimen: Blood from Arm, Left Updated: 08/01/24 0155     Comprehensive Metabolic Panel [[id number]]  (Abnormal) Collected: 08/01/24 0014     Specimen: Blood Updated: 08/01/24 0053       Glucose 86 mg/dL         BUN 22 mg/dL         Creatinine 1.41 mg/dL         Sodium 139 mmol/L         Potassium 4.2 mmol/L         Comment: Slight hemolysis detected by analyzer. Result may be falsely elevated.          Chloride 104 mmol/L         CO2 24.4 mmol/L         Calcium 9.1 mg/dL         Total Protein 6.1 g/dL         Albumin 3.5 g/dL         ALT (SGPT) 10 U/L         AST (SGOT) 32 U/L         Comment: Slight hemolysis detected by analyzer. Result may be falsely elevated.          Alkaline Phosphatase 149 U/L         Total Bilirubin 0.7 mg/dL         Globulin 2.6 gm/dL          A/G Ratio 1.3 g/dL         BUN/Creatinine Ratio 15.6       Anion Gap 10.6 mmol/L         eGFR 48.8 mL/min/1.73       Narrative:       GFR Normal >60  Chronic Kidney Disease <60  Kidney Failure <15     The GFR formula is only valid for adults with stable renal function between ages 18 and 70.     Single High Sensitivity Troponin T [[id number]]  (Abnormal) Collected: 08/01/24 0014     Specimen: Blood Updated: 08/01/24 0053       HS Troponin T 110 ng/L       Narrative:       High Sensitive Troponin T Reference Range:  <14.0 ng/L- Negative Female for AMI  <22.0 ng/L- Negative Male for AMI  >=14 - Abnormal Female indicating possible myocardial injury.  >=22 - Abnormal Male indicating possible myocardial injury.   Clinicians would have to utilize clinical acumen, EKG, Troponin, and serial changes to determine if it is an Acute Myocardial Infarction or myocardial injury due to an underlying chronic condition.           CBC & Differential [[id number]]  (Abnormal) Collected: 08/01/24 0014     Specimen: Blood Updated: 08/01/24 0050     Narrative:       The following orders were created for panel order CBC & Differential.  Procedure                               Abnormality         Status                     ---------                               -----------         ------                     CBC Auto Differential[[id number]]        Abnormal            Final result               Scan Slide[[id number]]                                       Final result                  Please view results for these tests on the individual orders.     CBC Auto Differential [[id number]]  (Abnormal) Collected: 08/01/24 0014     Specimen: Blood Updated: 08/01/24 0050       WBC 8.95 10*3/mm3         RBC 3.85 10*6/mm3         Hemoglobin 11.4 g/dL         Hematocrit 37.1 %         MCV 96.4 fL         MCH 29.6 pg         MCHC 30.7 g/dL         RDW 15.7 %         RDW-SD 55.4 fl         MPV 10.0 fL         Platelets 129 10*3/mm3         Neutrophil %  87.3 %         Lymphocyte % 6.9 %         Monocyte % 4.9 %         Eosinophil % 0.3 %         Basophil % 0.2 %         Immature Grans % 0.4 %         Neutrophils, Absolute 7.80 10*3/mm3         Lymphocytes, Absolute 0.62 10*3/mm3         Monocytes, Absolute 0.44 10*3/mm3         Eosinophils, Absolute 0.03 10*3/mm3         Basophils, Absolute 0.02 10*3/mm3         Immature Grans, Absolute 0.04 10*3/mm3         nRBC 0.0 /100 WBC       Scan Slide [[id number]] Collected: 08/01/24 0014     Specimen: Blood Updated: 08/01/24 0050       Acanthocytes Slight/1+       Crenated RBC's Slight/1+       Poikilocytes Slight/1+       WBC Morphology Normal       Platelet Estimate Decreased     BNP [[id number]]  (Abnormal) Collected: 08/01/24 0014     Specimen: Blood Updated: 08/01/24 0048       proBNP 21,470.0 pg/mL       Narrative:       This assay is used as an aid in the diagnosis of individuals suspected of having heart failure. It can be used as an aid in the diagnosis of acute decompensated heart failure (ADHF) in patients presenting with signs and symptoms of ADHF to the emergency department (ED). In addition, NT-proBNP of <300 pg/mL indicates ADHF is not likely.     Age Range        Result Interpretation  NT-proBNP Concentration (pg/mL:        <50             Positive            >450                        [place]                        300-450                          Negative                        <300     50-75           Positive            >900                  Gray                300-900                  Negative            <300        >75             Positive            >1800                  Gray                300-1800                  Negative            <300     COVID-19,CEPHEID/DAVID,COR/KAYLYN/PAD/EDEN/LAG/SAFIA IN-HOUSE,NP SWAB IN TRANSPORT MEDIA 1 HR TAT, RT-PCR - Swab, Nasopharynx [[id number]]  (Normal) Collected: 08/01/24 0014     Specimen: Swab from Nasopharynx Updated: 08/01/24 0043       COVID19 Not Detected      Narrative:       Fact sheet for providers: https://www.fda.gov/media/023443/download      Fact sheet for patients: https://www.fda.gov/media/996647/download  Fact sheet for providers: https://www.fda.gov/media/431535/download     Fact sheet for patients: https://www.fda.gov/media/432467/download     Test performed by PCR.     Wilmer Draw [[id number]] Collected: 08/01/24 0014     Specimen: Blood Updated: 08/01/24 0031     Narrative:       The following orders were created for panel order Wilmer Draw.  Procedure                               Abnormality         Status                     ---------                               -----------         ------                     Green Top (Gel)[[id number]]                                  Final result               Lavender Top[[id number]]                                     Final result               Gold Top - SST[[id number]]                                   Final result               Light Blue Top[[id number]]                                   Final result                  Please view results for these tests on the individual orders.     Green Top (Gel) [[id number]] Collected: 08/01/24 0014     Specimen: Blood Updated: 08/01/24 0031       Extra Tube Hold for add-ons.       Comment: Auto resulted.        Lavender Top [[id number]] Collected: 08/01/24 0014     Specimen: Blood Updated: 08/01/24 0031       Extra Tube hold for add-on       Comment: Auto resulted        Gold Top - SST [[id number]] Collected: 08/01/24 0014     Specimen: Blood Updated: 08/01/24 0031       Extra Tube Hold for add-ons.       Comment: Auto resulted.        Light Blue Top [[id number]] Collected: 08/01/24 0014     Specimen: Blood Updated: 08/01/24 0031       Extra Tube Hold for add-ons.       Comment: Auto resulted        POC Lactate [[id number]]  (Normal) Collected: 08/01/24 0020     Specimen: Blood Updated: 08/01/24 0022       Lactate 1.6 mmol/L         Comment: Serial Number: [id number]:  [id number]         Blood Gas, Arterial - [[id number]]  (Abnormal) Collected: 08/01/24 0014     Specimen: Arterial Blood Updated: 08/01/24 0021       Site Left Radial       Ash's Test Positive       pH, Arterial 7.339 pH units         pCO2, Arterial 48.9 mm Hg         pO2, Arterial 103.4 mm Hg         HCO3, Arterial 26.4 mmol/L         Base Excess, Arterial 0.2 mmol/L         Comment: Serial Number: [id number]  [id number]          O2 Saturation, Arterial 97.5 %         CO2 Content 27.9 mmol/L         Barometric Pressure for Blood Gas --       Comment: N/A          Modality Cannula       FIO2 36 %         Hemodilution No       PO2/FIO2 287     Blood Culture - Blood, Arm, Right [[id number]] Collected: 08/01/24 0014     Specimen: Blood from Arm, Right Updated: 08/01/24 0021                Imaging Results (Last 24 Hours)         Procedure Component Value Units Date/Time     CT Head Without Contrast [[id number]] Collected: 08/01/24 0120       Updated: 08/01/24 0124     Narrative:       CT HEAD WO CONTRAST     Date of Exam: 8/1/2024 1:00 AM EDT     Indication: ams.     Comparison: 11/27/2023.     Technique: Axial CT images were obtained of the head without contrast administration.  Coronal reconstructions were performed.  Automated exposure control and iterative reconstruction methods were used.        Findings:  Superficial soft tissues appear within normal limits. Mild left ethmoid sinus and left maxillary sinus mucosal disease status post antrostomy. Mastoids are clear. Paranasal sinuses and mastoid air cells appear well aerated.  Orbits are unremarkable.    There is no acute intracranial hemorrhage.  No mass effect or midline shift.  No abnormal extra-axial collections.  Gray-white differentiation is within normal limits. Stable patchy white matter hypoattenuation; mild to moderate in magnitude. There is   generalized parenchymal volume loss congruent with age. There are small chronic lacunar infarctions in the basal ganglia.  Ventricular size and configuration is normal for age.        Impression:       Impression:  1.No acute intracranial abnormality.  2.Stable mild to moderate chronic small vessel ischemic change and generalized parenchymal volume loss.  3.Mild paranasal sinus mucosal disease status post antrostomy.           Electronically Signed: Sadiq Reza MD    8/1/2024 1:22 AM EDT    Workstation ID: [id number]     XR Chest 1 View [[id number]] Collected: 08/01/24 0057       Updated: 08/01/24 0100     Narrative:       XR CHEST 1 VW     Date of Exam: 8/1/2024 12:20 AM EDT     Indication: ams     Comparison: 6/27/2024     Findings:  There is worsened moderate pulmonary edema. There are stable small small bilateral pleural effusions. There is evidence of prior median sternotomy and CABG. No pneumothorax. Pulmonary vasculature is indistinct. Cardiac silhouette is partially obscured,   but likely unchanged from the prior study. No acute osseous abnormality.        Impression:       Impression:  Worsening moderate pulmonary edema with stable small bilateral pleural effusions.           Electronically Signed: Sadiq Reza MD    8/1/2024 12:58 AM EDT    Workstation ID: [id number]                LAB RESULTS (LAST 7 DAYS)     CBC       Results from last 7 days   Lab Units 08/01/24  0014   WBC 10*3/mm3 8.95   RBC 10*6/mm3 3.85*   HEMOGLOBIN g/dL 11.4*   HEMATOCRIT % 37.1*   MCV fL 96.4   PLATELETS 10*3/mm3 129*         BMP        Results from last 7 days   Lab Units 08/01/24  0417 08/01/24  0014   SODIUM mmol/L 141 139   POTASSIUM mmol/L 3.7 4.2   CHLORIDE mmol/L 106 104   CO2 mmol/L 25.5 24.4   BUN mg/dL 22 22   CREATININE mg/dL 1.26 1.41*   GLUCOSE mg/dL 85 86         CMP         Results from last 7 days   Lab Units 08/01/24  0417 08/01/24  0014   SODIUM mmol/L 141 139   POTASSIUM mmol/L 3.7 4.2   CHLORIDE mmol/L 106 104   CO2 mmol/L 25.5 24.4   BUN mg/dL 22 22   CREATININE mg/dL 1.26 1.41*   GLUCOSE mg/dL 85 86   ALBUMIN g/dL  --  3.5    BILIRUBIN mg/dL  --  0.7   ALK PHOS U/L  --  149*   AST (SGOT) U/L  --  32   ALT (SGPT) U/L  --  10         BNP         TROPONIN       Results from last 7 days   Lab Units 08/01/24  0147   HSTROP T ng/L 137*         CoAg         Creatinine Clearance  Estimated Creatinine Clearance: 35 mL/min (by C-G formula based on SCr of 1.26 mg/dL).     ABG       Results from last 7 days   Lab Units 08/01/24  0014   PH, ARTERIAL pH units 7.339*   PCO2, ARTERIAL mm Hg 48.9*   PO2 ART mm Hg 103.4   O2 SATURATION ART % 97.5   BASE EXCESS ART mmol/L 0.2            Radiology  CT Head Without Contrast     Result Date: 8/1/2024  Impression: 1.No acute intracranial abnormality. 2.Stable mild to moderate chronic small vessel ischemic change and generalized parenchymal volume loss. 3.Mild paranasal sinus mucosal disease status post antrostomy. Electronically Signed: Sadiq Reza MD  8/1/2024 1:22 AM EDT  Workstation ID: [id number]     XR Chest 1 View     Result Date: 8/1/2024  Impression: Worsening moderate pulmonary edema with stable small bilateral pleural effusions. Electronically Signed: Sadiq Reza MD  8/1/2024 12:58 AM EDT  Workstation ID: [id number]         EKG  I personally viewed and interpreted the patient's EKG/Telemetry data:  ECG 12 Lead Other; Sepsis   Final Result   HEART RATE=73  bpm   RR Npiiajuc=467  ms   TN Zicopgto=760  ms   P Horizontal Axis=  deg   P Front Axis=91  deg   QRSD Qzpgbpit=358  ms   QT Udioqxio=332  ms   GIiL=886  ms   QRS Axis=82  deg   T Wave Axis=131  deg   - ABNORMAL ECG -   Sinus rhythm   LVH with secondary repolarization abnormality   When compared with ECG of 25-Feb-2020 13:18:04,   Significant repolarization change   Significant axis, voltage or hypertrophy change   Electronically Signed By: Michael Lozano (Dariusz) 2024-08-01 06:23:55   Date and Time of Study:2024-08-01 00:07:06       Telemetry Scan   Final Result       Telemetry Scan   Final Result                        Echocardiogram:              Stress Test:           Cardiac Catheterization:  No results found for this or any previous visit.           Other:        ASSESSMENT & PLAN:     Principal Problem:    CHF exacerbation  Active Problems:    CAD (coronary artery disease)    Depression, unspecified    Heart failure, unspecified    Chronic obstructive pulmonary disease, unspecified        HFrEF  Previous echocardiogram EF 40 to 45%, now down to around 30% with regional abnormalities as described, akinesis of the posterior wall inferolateral and high lateral wall, global hypokinesis otherwise  Elevated filling pressures, grade 2 diastolic dysfunction  Mild pulmonary hypertension  Likely low gradient severe arctic stenosis with moderate to severe mitral valve regurgitation, LV enlargement     Elevated troponin  High-sensitivity troponin x 2:110, 137  EKG with no acute ST or T wave segment abnormalities  Patient denies chest pain  Likely secondary to demand ischemia, strain and elevated filling pressures     Multivessel coronary artery disease  Previous remote CABG  Reported post CABG PCI  Last ischemic evaluation appears to be cardiac catheterization in 2021 with patent LIMA to the LAD,  Saphenous vein graft to the diagonal patent, saphenous vein graft to the OM patent.  30% RCA stenosis  Secondary prevention goals  Statin     Reported bradycardia  Heart rate currently mid 50s     Reported hypotension, antihypertensives carvedilol on hold, midodrine 3 times daily keep systolic greater than 110 with significant aortic valve stenosis  Needs further volume off     Mental status changes  Etiology unclear, multifactorial  Head CT with no acute findings         Plan:   Over the weekend coreg was resumed along with low dose lisinopril  Reduce coreg to 3.125 as patient has HR in the 50s  Off midodrine  Off lasix currently, SRIKANTH improving  Nephrology to see patient  Will start low dose lasix  Monitor b/p, need to avoid hypotension with aortic stenosis    Will need  outpt cardiology f/u with primary cardiologist for TAVR w/u with viry Marsh, COLE  08/05/24  13:38 EDT

## 2024-08-06 VITALS
HEIGHT: 66 IN | DIASTOLIC BLOOD PRESSURE: 68 MMHG | WEIGHT: 125.88 LBS | BODY MASS INDEX: 20.23 KG/M2 | RESPIRATION RATE: 12 BRPM | OXYGEN SATURATION: 100 % | TEMPERATURE: 97.7 F | SYSTOLIC BLOOD PRESSURE: 129 MMHG | HEART RATE: 60 BPM

## 2024-08-06 LAB
BACTERIA SPEC AEROBE CULT: NORMAL
BACTERIA SPEC AEROBE CULT: NORMAL

## 2024-08-06 PROCEDURE — 97116 GAIT TRAINING THERAPY: CPT

## 2024-08-06 PROCEDURE — 97110 THERAPEUTIC EXERCISES: CPT

## 2024-08-06 RX ORDER — LISINOPRIL 5 MG/1
5 TABLET ORAL
Qty: 30 TABLET | Refills: 0 | Status: SHIPPED | OUTPATIENT
Start: 2024-08-06 | End: 2024-09-05

## 2024-08-06 RX ORDER — FUROSEMIDE 20 MG/1
20 TABLET ORAL DAILY
Qty: 30 TABLET | Refills: 0 | Status: SHIPPED | OUTPATIENT
Start: 2024-08-06 | End: 2024-09-05

## 2024-08-06 RX ADMIN — Medication 10 ML: at 09:47

## 2024-08-06 RX ADMIN — Medication 1 TABLET: at 09:47

## 2024-08-06 RX ADMIN — PANTOPRAZOLE SODIUM 40 MG: 40 TABLET, DELAYED RELEASE ORAL at 05:44

## 2024-08-06 RX ADMIN — CARVEDILOL 3.12 MG: 3.12 TABLET, FILM COATED ORAL at 09:47

## 2024-08-06 RX ADMIN — SERTRALINE HYDROCHLORIDE 25 MG: 25 TABLET ORAL at 09:47

## 2024-08-06 RX ADMIN — LISINOPRIL 5 MG: 5 TABLET ORAL at 09:47

## 2024-08-06 RX ADMIN — FUROSEMIDE 20 MG: 20 TABLET ORAL at 09:47

## 2024-08-06 NOTE — PROGRESS NOTES
PROGRESS NOTE      Patient Name: Andrea Luna  : 1940  MRN: [id number]  Primary Care Physician: Emily Barahona DO  Date of admission: 2024    Patient Care Team:  Emily Barahona DO as PCP - General (Family Medicine)        Subjective   Subjective:     Comfortable.  Review of systems:  Negative.      Allergies:    Allergies   Allergen Reactions    Codeine Rash    Latex Rash     Other reaction(s): ; 5/3/2006 11:36:20 AM    Sulfa Antibiotics Rash       Objective   Exam:     Vital Signs  Temp:  [97.5 °F (36.4 °C)-98.4 °F (36.9 °C)] 97.7 °F (36.5 °C)  Heart Rate:  [57-60] 60  Resp:  [12-17] 12  BP: (129-153)/(68-82) 129/68  SpO2:  [100 %] 100 %  on  Flow (L/min):  [2] 2;   Device (Oxygen Therapy): nasal cannula  Body mass index is 20.32 kg/m².    General:    male in no acute distress.    Head:      Normocephalic and atraumatic.    Eyes:      PERRL/EOM intact, conjunctiva and sclera clear with out nystagmus.    Neck:      No masses, thyromegaly,  trachea central with normal respiratory effort   Lungs:    Clear bilaterally to auscultation.    Heart:      Regular rate and rhythm, no murmur no gallop  Abd:        Soft, nontender, not distended, bowel sounds positive, no shifting dullness   Pulses:   Pulses palpable  Extr:        No cyanosis or clubbing--+edema.    Neuro:    No focal deficits.   alert oriented x3  Skin:       Intact without lesions or rashes.    Psych:    Alert and cooperative; normal mood and affect; .      Results Review:  I have personally reviewed most recent Data :  CBC    Results from last 7 days   Lab Units 24  0028 24  0406 24  0443 24  0306 24  0014   WBC 10*3/mm3 3.59 3.91 4.37 4.71 8.95   HEMOGLOBIN g/dL 8.9* 9.8* 9.7* 9.2* 11.4*   PLATELETS 10*3/mm3 134* 144 131* 113* 129*     CMP   Results from last 7 days   Lab Units 24  0028 24  1020 24  0443 24  0306 24  0417 24  0014   SODIUM mmol/L 143 142 142 139  141 139   POTASSIUM mmol/L 4.4 3.1* 3.4* 3.4* 3.7 4.2   CHLORIDE mmol/L 103 100 104 103 106 104   CO2 mmol/L 34.2* 35.8* 31.0* 28.7 25.5 24.4   BUN mg/dL 24* 22 30* 31* 22 22   CREATININE mg/dL 1.12 1.11 1.46* 1.56* 1.26 1.41*   GLUCOSE mg/dL 113* 110* 86 88 85 86   ALBUMIN g/dL  --  3.4*  --   --   --  3.5   BILIRUBIN mg/dL  --  0.4  --   --   --  0.7   ALK PHOS U/L  --  139*  --   --   --  149*   AST (SGOT) U/L  --  27  --   --   --  32   ALT (SGPT) U/L  --  13  --   --   --  10     ABG    Results from last 7 days   Lab Units 08/01/24  0014   PH, ARTERIAL pH units 7.339*   PCO2, ARTERIAL mm Hg 48.9*   PO2 ART mm Hg 103.4   O2 SATURATION ART % 97.5   BASE EXCESS ART mmol/L 0.2     No radiology results for the last day    Results for orders placed during the hospital encounter of 07/31/24    Adult Transthoracic Echo Complete W/ Cont if Necessary Per Protocol    Interpretation Summary    Left ventricular systolic function is normal. Left ventricular ejection fraction appears to be 31 - 35%.    The left ventricular cavity is borderline dilated.    Left ventricular wall thickness is consistent with mild concentric hypertrophy.    Left ventricular diastolic function is consistent with (grade II w/high LAP) pseudonormalization.    Mildly reduced right ventricular systolic function noted.    The right ventricular cavity is borderline dilated.    The left atrial cavity is severely dilated.    Left atrial volume is severely increased.    The right atrial cavity is severely  dilated.    Severe aortic valve stenosis is present.    Abnormal mitral valve structure consistent with dilated annulus.    Moderate to severe mitral valve regurgitation is present with an eccentric jet noted.    Estimated right ventricular systolic pressure from tricuspid regurgitation is mildly elevated (35-45 mmHg).    Mild pulmonary hypertension is present.    Scheduled Meds:[Held by provider] aspirin, 81 mg, Oral, Daily  atorvastatin, 40 mg, Oral,  Nightly  carvedilol, 3.125 mg, Oral, BID With Meals  furosemide, 20 mg, Oral, Daily  lisinopril, 5 mg, Oral, Q24H  multivitamin with minerals, 1 tablet, Oral, Daily  pantoprazole, 40 mg, Oral, Q AM  sertraline, 25 mg, Oral, Daily  sodium chloride, 10 mL, Intravenous, Q12H  traMADol, 50 mg, Oral, Nightly      Continuous Infusions:   PRN Meds:  acetaminophen **OR** acetaminophen **OR** acetaminophen    albuterol    senna-docusate sodium **AND** polyethylene glycol **AND** bisacodyl **AND** bisacodyl    melatonin    nitroglycerin    ondansetron    Potassium Replacement - Follow Nurse / BPA Driven Protocol    sodium chloride    sodium chloride    sodium chloride    Assessment & Plan   Assessment and Plan:         CHF exacerbation    CAD (coronary artery disease)    Depression, unspecified    Heart failure, unspecified    Chronic obstructive pulmonary disease, unspecified    Severe malnutrition    ASSESSMENT:  Acute kidney injury, baseline serum creatinine is 1.2, creatinine in outlying hospital was 2.1, scr now is 1.68, 147, EGFR 40.  On the background history of of poor oral intake, hypotension, and COVID infection, worsening of renal function multifactorial, possible ATN.  Renal US: Right kidney 9.1 cm, left kidney 7.5 cm, simple cyst of both kidneys, no masses no calculi, no hydronephrosis,  UA unremarkable  Serology reviewed: ORTEGA screen negative, normal complements, free kappa 38, free lambda 18, KL ratio 2.0, no monoclonal protein detected  History of hypertension  History of CAD  History of frequent falls  COVID-19 viral infection 2023  Hyperlipidemia     EF 45 to 50%, mild left ventricular hypertrophy, mild dilation of left atrium, mild to moderate mitral regurgitation, mild aortic stenosis.         PLAN:                                                                             SRIKANTH with baseline sCr 1.2, creatinine 1.4 yesterday likely hemodynamic mediated.  Agree with holding lasix.evaluate volume daily.likely  restart tomorrow.  Potassium low, magnesium stable.    BP trends acceptable  TTE shows LVEF 45% with moderate to severe aortic stenosis: Avoid very aggressive diuresis.   H/o COVID-19  positive 2023.  Avoid nephrotoxic agent  We will follow closely              Electronically signed by Dillan Degroot MD,   [facility] kidney consultant  [phone]  8/6/2024  07:21 EDT

## 2024-08-06 NOTE — THERAPY TREATMENT NOTE
"Subjective: Pt/nursing agreeable to therapeutic plan of care.    Objective:       Bed mobility - Min-A  Transfers - Min-A with FWW  Ambulation - 125 feet with FWW and Min A; PT managing supplemental oxygen     Therapeutic Exercise - 15 Reps B LE AROM supported sitting / chair; PT cuing provided for proper technique     Vitals: WNL    Pain: 0 VAS       Education: Verbal/Tactile Cues    Assessment: Andrea Luna presents with functional mobility impairments which indicate the need for skilled intervention. Tolerating session today without incident. Pt motivated/eager to participate. Pt remains below functional baseline and would benefit from continued skilled PT services upon d/c in order to obtain highest functional potential, lessen caregiver burden, and to decrease fall risk. Will continue to follow and progress as tolerated.     Plan/Recommendations:   If medically appropriate, Moderate Intensity Therapy recommended post-acute care. This is recommended as therapy feels the patient would require 3-4 days per week and wouldn't tolerate \"3 hour daily\" rehab intensity. SNF would be the preferred choice. If the patient does not agree to SNF, arrange HH or OP depending on home bound status. If patient is medically complex, consider LTACH. Pt requires no DME at discharge.     Pt desires Skilled Rehab placement at discharge. Pt cooperative; agreeable to therapeutic recommendations and plan of care.         Basic Mobility 6-click:  Rollin = Total, A lot = 2, A little = 3; 4 = None  Supine>Sit:   1 = Total, A lot = 2, A little = 3; 4 = None   Sit>Stand with arms:  1 = Total, A lot = 2, A little = 3; 4 = None  Bed>Chair:   1 = Total, A lot = 2, A little = 3; 4 = None  Ambulate in room:  1 = Total, A lot = 2, A little = 3; 4 = None  3-5 Steps with railin = Total, A lot = 2, A little = 3; 4 = None  Score: 16        Post-Tx Position: Up in Chair, Alarms activated, and Call light and personal items within " reach  PPE: gloves

## 2024-08-06 NOTE — CASE MANAGEMENT/SOCIAL WORK
Continued Stay Note  PAULA Clark     Patient Name: Andrea Luna  MRN: [id number]  Today's Date: 8/6/2024    Admit Date: 7/31/2024    Plan: [facility], bed ready 8/6. No precert required. PASRR approved.   Discharge Plan       Row Name 08/06/24 0937       Plan    Plan [facility] accepted, bed ready 8/6. No precert required. PASRR approved.    Patient/Family in Agreement with Plan yes    Plan Comments [facility] is able to accept pt for rehab and bed is ready 8/6 by 10 AM, per liaison Yvonne. CM asked about wheelchair van transportation with facility and  is available to do  at 12 noon. CM updated daughter Radha by phone. Pt has clothes, house shoes, and dentures that will need to go with him to facility.             Megan Naegele, RN     Office Phone: [phone]  Office Cell: [phone]

## 2024-08-06 NOTE — PROGRESS NOTES
Cardiology [place]        LOS:  LOS: 3 days   Patient Name: Andrea Luna  Age/Sex: 84 y.o. male  : 1940  MRN: [id number]    Day of Service: 24   Length of Stay: 3  Encounter Provider: COLE Esteves  Place of Service: [facility] CARDIOLOGY  Patient Care Team:  Emily Barahona DO as PCP - General (Family Medicine)    Subjective:     Chief Complaint: f/u HF    Subjective: patient more alert. He has no acute complaints. His edema is resolved. He is on 2L NC  Sinus bradycardia, HR in the 50s.    Patient is going to d/c to rehab today    Current Medications:   Scheduled Meds:[Held by provider] aspirin, 81 mg, Oral, Daily  atorvastatin, 40 mg, Oral, Nightly  carvedilol, 3.125 mg, Oral, BID With Meals  furosemide, 20 mg, Oral, Daily  lisinopril, 5 mg, Oral, Q24H  multivitamin with minerals, 1 tablet, Oral, Daily  pantoprazole, 40 mg, Oral, Q AM  sertraline, 25 mg, Oral, Daily  sodium chloride, 10 mL, Intravenous, Q12H  traMADol, 50 mg, Oral, Nightly      Continuous Infusions:     Allergies:  Allergies   Allergen Reactions    Codeine Rash    Latex Rash     Other reaction(s): ; 5/3/2006 11:36:20 AM    Sulfa Antibiotics Rash       Review of Systems   Constitutional: Negative for chills, diaphoresis and malaise/fatigue.   Cardiovascular:  Negative for chest pain, dyspnea on exertion, irregular heartbeat, leg swelling, near-syncope, orthopnea, palpitations, paroxysmal nocturnal dyspnea and syncope.   Respiratory:  Negative for cough, shortness of breath, sleep disturbances due to breathing and sputum production.    Gastrointestinal:  Negative for change in bowel habit.   Genitourinary:  Negative for urgency.   Neurological:  Negative for dizziness and headaches.   Psychiatric/Behavioral:  Negative for altered mental status.          Objective:     Temp:  [97.5 °F (36.4 °C)-98.4 °F (36.9 °C)] 97.7 °F (36.5 °C)  Heart Rate:  [57-60] 60  Resp:  [12-17] 12  BP:  "(129153)/(68-82) 129/68     Intake/Output Summary (Last 24 hours) at 8/6/2024 1043  Last data filed at 8/6/2024 0400  Gross per 24 hour   Intake 660 ml   Output 476 ml   Net 184 ml     Body mass index is 20.32 kg/m².      08/01/24  0700 08/01/24  2214 08/03/24  1700   Weight: 57.6 kg (127 lb) 59.9 kg (132 lb 0.9 oz) 57.1 kg (125 lb 14.1 oz)         General Appearance:    Alert, cooperative, in no acute distress                                Head: Atraumatic, normocephalic, PERRLA               Neck:   supple, trachea midline, no thyromegaly, no carotid bruit, no JVD   Lungs:    Supplemental O2, dim bilat bases    Heart:    Regular rhythm and normal rate, harsh blowing murmur   Abdomen:     Normal bowel sounds, no masses, no organomegaly, soft  nontender, nondistended, no guarding, no rebound  tenderness   Extremities:   Moves all extremities well, no edema, no cyanosis, no  redness   Pulses:   Pulses palpable and equal bilaterally   Skin:   No bleeding, bruising or rash   Neurologic:   Awake, alert, oriented x3         Lab Review:   Results from last 7 days   Lab Units 08/05/24  0028 08/04/24  1020 08/01/24  0417 08/01/24  0014   SODIUM mmol/L 143 142   < > 139   POTASSIUM mmol/L 4.4 3.1*   < > 4.2   CHLORIDE mmol/L 103 100   < > 104   CO2 mmol/L 34.2* 35.8*   < > 24.4   BUN mg/dL 24* 22   < > 22   CREATININE mg/dL 1.12 1.11   < > 1.41*   GLUCOSE mg/dL 113* 110*   < > 86   CALCIUM mg/dL 8.7 9.0   < > 9.1   AST (SGOT) U/L  --  27  --  32   ALT (SGPT) U/L  --  13  --  10    < > = values in this interval not displayed.     Results from last 7 days   Lab Units 08/01/24  0147 08/01/24  0014   HSTROP T ng/L 137* 110*     Results from last 7 days   Lab Units 08/05/24  0028 08/04/24  0406   WBC 10*3/mm3 3.59 3.91   HEMOGLOBIN g/dL 8.9* 9.8*   HEMATOCRIT % 30.1* 32.8*   PLATELETS 10*3/mm3 134* 144                   Invalid input(s): \"LDLCALC\"  Results from last 7 days   Lab Units 08/01/24  0014   PROBNP pg/mL 21,470.0* "           Recent Radiology:  Imaging Results (Most Recent)       Procedure Component Value Units Date/Time    CT Abdomen Pelvis With & Without Contrast [[id number]] Collected: 08/02/24 1018     Updated: 08/02/24 1031    Narrative:      CT ABDOMEN PELVIS W WO CONTRAST    Date of Exam: 8/2/2024 9:57 AM EDT    Indication: S hematuria.    Comparison: Renal ultrasound 4/20/2024. CT abdomen and pelvis with contrast 1/6/2020.    Technique: Axial CT images were obtained of the abdomen and pelvis before and after the uneventful intravenous administration of iodinated contrast. Sagittal and coronal reconstructions were performed.  Automated exposure control and iterative   reconstruction methods were used.      Findings:  No left renal stone is seen. Benign left renal hilar vascular calcification and benign left renal cortical calcifications are present. There is a punctate nonobstructing stone within the right mid kidney. No ureteral stone is identified.    A 1.9 cm hyperdense lesion is seen in the lateral cortex of the right mid kidney (noncontrast Hounsfield unit 73.6). It appears to demonstrate contrast enhancement (corticomedullary phase postcontrast Hounsfield units 89.6, delayed 5-minute phase of   postcontrast Hounsfield units of 88.6), elevating index of suspicion for potential malignancy. However, the 1/6/2020 CT abdomen demonstrates a similar size and shape hyperdense lesion in the same location.    Simple bilateral renal cysts are present, largest at the lower pole measuring 3.7 cm. There is multifocal left renal cortical scarring and mild to moderate left renal atrophy.    The urinary bladder is within normal limits.    The liver, spleen, adrenals are within normal limits. Uncomplicated cholelithiasis. Heavy calcific atherosclerosis within the abdominal aorta, proximal bilateral renal arteries. Pancreas is mildly atrophic with signs of chronic calcific pancreatitis.    The stomach, and the large and small bowel,  do not appear abnormally thickened, dilated or inflamed. Advanced uncomplicated diverticular changes are present of the sigmoid colon. There is small pelvic ascites. There is generalized body wall edema.    Moderate right and small left pleural effusions are present. The left pleural effusion appears chronic, loculated, with wall calcification. There is chronic rounded atelectasis within the left lower lobe. There is mild atelectasis in the right middle   lobe and right lower lobe.    Heart size is moderately enlarged. Coronary artery calcifications are present. Median sternotomy changes are seen. There is a mild aortic valve calcification.    Right hip replacement changes are noted. Lumbar levoscoliosis. Degenerative disc endplate changes and facet arthropathy within the spine. No acute or suspicious osseous abnormalities.        Impression:      Impression:    1. Indeterminate 1.9 cm lesion at the lateral cortical margin of the right mid kidney. Given its stability and size and morphology since the CT abdomen of 1/6/2020, it would favor a benign etiology. However, on today's examination, it does appear to   demonstrate contrast enhancement, which would favor malignant etiology. Any further decision as to whether to continue imaging surveillance versus soft tissue biopsy should be based upon clinical assessment for this 84-year-old patient.  2. Bilateral renal cysts.  3. Heavy calcific atherosclerosis within the bilateral renal arteries, left greater than right. Left renal artery stenosis not excluded.  4.. Mild to moderate left renal atrophy with multifocal cortical scarring.  5. Tiny nonobstructing right renal stone.  6. Moderate right pleural effusion with right basilar atelectasis. Chronic left basilar pleural effusion and chronic rounded to left basilar atelectasis.  7. Sigmoid colonic diverticulosis.        Electronically Signed: Abigail William MD    8/2/2024 10:29 AM EDT    Workstation ID: [id number]    CT  Head Without Contrast [[id number]] Collected: 08/01/24 0120     Updated: 08/01/24 0124    Narrative:      CT HEAD WO CONTRAST    Date of Exam: 8/1/2024 1:00 AM EDT    Indication: ams.    Comparison: 11/27/2023.    Technique: Axial CT images were obtained of the head without contrast administration.  Coronal reconstructions were performed.  Automated exposure control and iterative reconstruction methods were used.      Findings:  Superficial soft tissues appear within normal limits. Mild left ethmoid sinus and left maxillary sinus mucosal disease status post antrostomy. Mastoids are clear. Paranasal sinuses and mastoid air cells appear well aerated.  Orbits are unremarkable.    There is no acute intracranial hemorrhage.  No mass effect or midline shift.  No abnormal extra-axial collections.  Gray-white differentiation is within normal limits. Stable patchy white matter hypoattenuation; mild to moderate in magnitude. There is   generalized parenchymal volume loss congruent with age. There are small chronic lacunar infarctions in the basal ganglia. Ventricular size and configuration is normal for age.      Impression:      Impression:  1.No acute intracranial abnormality.  2.Stable mild to moderate chronic small vessel ischemic change and generalized parenchymal volume loss.  3.Mild paranasal sinus mucosal disease status post antrostomy.        Electronically Signed: Sadiq Reza MD    8/1/2024 1:22 AM EDT    Workstation ID: [id number]    XR Chest 1 View [[id number]] Collected: 08/01/24 0057     Updated: 08/01/24 0100    Narrative:      XR CHEST 1 VW    Date of Exam: 8/1/2024 12:20 AM EDT    Indication: ams    Comparison: 6/27/2024    Findings:  There is worsened moderate pulmonary edema. There are stable small small bilateral pleural effusions. There is evidence of prior median sternotomy and CABG. No pneumothorax. Pulmonary vasculature is indistinct. Cardiac silhouette is partially obscured,   but likely unchanged from  the prior study. No acute osseous abnormality.      Impression:      Impression:  Worsening moderate pulmonary edema with stable small bilateral pleural effusions.        Electronically Signed: Sadiq Reza MD    8/1/2024 12:58 AM EDT    Workstation ID: [id number]            ECHOCARDIOGRAM:    Results for orders placed during the hospital encounter of 07/31/24    Adult Transthoracic Echo Complete W/ Cont if Necessary Per Protocol    Interpretation Summary    Left ventricular systolic function is normal. Left ventricular ejection fraction appears to be 31 - 35%.    The left ventricular cavity is borderline dilated.    Left ventricular wall thickness is consistent with mild concentric hypertrophy.    Left ventricular diastolic function is consistent with (grade II w/high LAP) pseudonormalization.    Mildly reduced right ventricular systolic function noted.    The right ventricular cavity is borderline dilated.    The left atrial cavity is severely dilated.    Left atrial volume is severely increased.    The right atrial cavity is severely  dilated.    Severe aortic valve stenosis is present.    Abnormal mitral valve structure consistent with dilated annulus.    Moderate to severe mitral valve regurgitation is present with an eccentric jet noted.    Estimated right ventricular systolic pressure from tricuspid regurgitation is mildly elevated (35-45 mmHg).    Mild pulmonary hypertension is present.        I reviewed the patient's new clinical results.    EKG:      Assessment:       CHF exacerbation    CAD (coronary artery disease)    Depression, unspecified    Heart failure, unspecified    Chronic obstructive pulmonary disease, unspecified    Severe malnutrition    Expand All Collapse All    Cardiology consult note  Naveed Morin MD, PhD  8-1-2024           Referring Provider: Brad Alicea MD     Reason for Consultation:       Heart failure  Elevated troponin  Mental status changes        Patient Care  Team:  Emily Barahona DO as PCP - General (Family Medicine)     Seen and examined agree with narrative as discussed with nurse practitioner after face-to-face encounter scruff findings below greater than 50% of total encounter time and medical decision making performed by me        SUBJECTIVE      Chief Complaint: Mental status changes     History of present illness:  Andrea Luna is a 84 y.o. male with a history of coronary artery disease who presented to [facility] with complaint of mental status changes from his facility.     Patient is a poor historian and really unable to provide me any details about his medical history.  Information is obtained from reviewing his chart.  There is no family present.     Patient lives in a long-term care facility.  It is reported that he had altered mental status, hypotension and bradycardia and was transferred to the emergency room for further evaluation.     In the emergency room the patient's blood pressure was reported to be 70/40.  He was given IV normal saline.  Heart rate was reported to be in the 40s.     Evaluation in the emergency room includes high-sensitivity troponin times 2/1/2010, 137 proBNP 21,470.  BUN and creatinine 22 and 1.2.  Potassium 3.7 lactic was 1.6 white blood cell count 8.9 hemoglobin 11.4 hematocrit 37.1 platelets 129 blood cultures are pending.  COVID 19 swab was negative.  CT of his head showed no acute intracranial abnormalities.  Chest x-ray showed moderate pulmonary edema with small bilateral pleural effusions.  EKG on admission showed sinus rhythm with a heart rate of 73.  LVH.  No acute ST or T wave segment abnormalities     Patient is an 84-year-old gentleman who routinely follows with cardiology.  He is seen by [facility] specialist typically Dr. Hollingsworth.     Review of his medical records show that the patient has a history of coronary artery disease with remote CABG in 2003.  Patient is known to have hypertension,  dyslipidemia, aortic stenosis.  Last echocardiogram was reported to have EF of 45 to 50% with moderate aortic stenosis.     2D echo today reviewed and interpreted by me demonstrates an EF of around 30% with regional abnormalities with akinesis of the posterior wall inferolateral wall and high lateral wall, mild LV dilation, likely low gradient severe aortic valve stenosis with moderate to severe eccentric jet of mitral valve vegetation and dilated mitral valve annulus with tethered leaflets given LV enlargement.  RV appears mildly hypokinetic globally, no mass or effusion seen, elevated pulmonary pressures with mild pulm hypertension, IVC is dilated consistent with volume overload right atrial pressure estimated 15-20     Patient has had post CABG PCI to the RCA.     Last catheterization is reported to have 100% native LAD occlusion, 100% native left circumflex occlusion, 30% RCA stenosis.  LIMA to the LAD was patent: saphenous vein graft to the diagonal is patent, saphenous vein graft to the OM was patent.  EF by LV gram was 40 to 45%     Historical data copied forward from previous encounters in EMR is unchanged     Review of systems otherwise negative x 14 point review of systems except as mentioned above  Hard of hearing  Limited history obtained, patient saying he cannot remember some of his medical history but he knows Dr. Hollingsworth takes care of him              Personal History:       Medical History        Past Medical History:   Diagnosis Date    SRIKANTH (acute kidney injury)      Aortic valve stenosis      Cervical spinal stenosis      CHF (congestive heart failure)      Coronary artery disease      DDD (degenerative disc disease), cervical      Elevated cholesterol      Hyperlipidemia      Hypertension      Pneumonia              Surgical History         Past Surgical History:   Procedure Laterality Date    CORONARY ARTERY BYPASS GRAFT                History reviewed. No pertinent family history.     Social  History   Social History           Tobacco Use    Smoking status: Never   Vaping Use    Vaping status: Never Used   Substance Use Topics    Alcohol use: Never    Drug use: Not Currently            Home meds:          Prior to Admission medications    Medication Sig Start Date End Date Taking? Authorizing Provider   albuterol sulfate  (90 Base) MCG/ACT inhaler Inhale 2 puffs Every 4 (Four) Hours As Needed for Wheezing.       Abbe May MD   aspirin 81 MG chewable tablet Chew 1 tablet Daily.       Abbe May MD   atorvastatin (LIPITOR) 40 MG tablet Take 1 tablet by mouth Every Night.       Abbe May MD   carvedilol (COREG) 6.25 MG tablet Take 1 tablet by mouth 2 (Two) Times a Day With Meals.       Abbe May MD   docusate sodium (COLACE) 100 MG capsule Take 1 capsule by mouth Daily.       Abbe May MD   Ferrous Sulfate 220 (44 Fe) MG/5ML oral solution Take 5 mL by mouth Daily.       Abbe May MD   furosemide (LASIX) 20 MG tablet Take 1 tablet by mouth 2 (Two) Times a Day.       Abbe May MD   lactulose (CHRONULAC) 10 GM/15ML solution Take 30 mL by mouth 2 (Two) Times a Day As Needed.       Abbe May MD   multivitamin with minerals tablet tablet Take 1 tablet by mouth Daily.       Abbe May MD   omeprazole (priLOSEC) 20 MG capsule Take 1 capsule by mouth Daily.       Abbe May MD   ondansetron (ZOFRAN) 4 MG tablet Take 1 tablet by mouth Every 4 (Four) Hours As Needed for Nausea or Vomiting.       Abbe May MD   polyethylene glycol (MiraLax) 17 g packet Take 17 g by mouth Daily.       Abbe May MD   potassium chloride (KLOR-CON M20) 20 MEQ CR tablet Take 1 tablet by mouth Daily.       Abbe May MD   sertraline (ZOLOFT) 25 MG tablet Take 1 tablet by mouth Daily.       Abbe May MD   traMADol (ULTRAM) 50 MG tablet Take 1 tablet by mouth Every Night.      "  Provider, MD Abbe         Allergies:     Codeine, Latex, and Sulfa antibiotics     Scheduled Meds:  Scheduled Medication   [START ON 8/2/2024] aspirin, 81 mg, Oral, Daily  atorvastatin, 40 mg, Oral, Nightly  carvedilol, 6.25 mg, Oral, BID With Meals  furosemide, 20 mg, Intravenous, Q6H  multivitamin with minerals, 1 tablet, Oral, Daily  pantoprazole, 40 mg, Oral, Q AM  sertraline, 25 mg, Oral, Daily  sodium chloride, 10 mL, Intravenous, Q12H  traMADol, 50 mg, Oral, Nightly         Continuous Infusions:  Infusion Medications         PRN Meds:  PRN Medication     acetaminophen **OR** acetaminophen **OR** acetaminophen    albuterol    senna-docusate sodium **AND** polyethylene glycol **AND** bisacodyl **AND** bisacodyl    melatonin    nitroglycerin    ondansetron    sodium chloride    sodium chloride    sodium chloride           OBJECTIVE     Vital Signs  Vitals          Vitals:     08/01/24 0316 08/01/24 0416 08/01/24 0447 08/01/24 0700   BP: 133/72 103/67   120/61   BP Location:       Right arm   Patient Position:       Lying   Pulse: 69 66   61   Resp:       12   Temp:       96.4 °F (35.8 °C)   TempSrc:       Oral   SpO2: 100% 97%   98%   Weight:     57.7 kg (127 lb 3.3 oz)     Height:                    Flowsheet Rows       Flowsheet Row First Filed Value   Admission Height 170.2 cm (67\") Documented at 07/31/2024 2330   Admission Weight 57.7 kg (127 lb 3.3 oz) Documented at 08/01/2024 0447                   Intake/Output Summary (Last 24 hours) at 8/1/2024 0915  Last data filed at 8/1/2024 0500      Gross per 24 hour   Intake 260 ml   Output --   Net 260 ml          Telemetry: Sinus rhythm     Physical Exam:  The patient is alert, oriented and in no distress.  Frail in appearance, nasal cannula  Vital signs as noted above.  Head and neck revealed no carotid bruits, positive JVD HJR  Lungs clear.  Diminished in the bases  Heart: Normal first and second heart sounds.  Systolic murmur present.   Abdomen: Soft " and nontender.  No organomegaly is present.  Extremities with good peripheral pulses, positive lower extremity edema especially posteriorly with significant pitting 1-2+  Skin: Warm and dry.  Musculoskeletal system is grossly normal.  CNS grossly normal.  No gross deficits        Results Review:  I have personally reviewed the results from the time of this admission to 8/1/2024 09:15 EDT and agree with these findings:  []  Laboratory  []  Microbiology  []  Radiology  []  EKG/Telemetry   []  Cardiology/Vascular   []  Pathology  []  Old records  []  Other:     Most notable findings include:      Lab Results (last 24 hours)         Procedure Component Value Units Date/Time     Basic Metabolic Panel [[id number]]  (Abnormal) Collected: 08/01/24 0417     Specimen: Blood Updated: 08/01/24 0453       Glucose 85 mg/dL         BUN 22 mg/dL         Creatinine 1.26 mg/dL         Sodium 141 mmol/L         Potassium 3.7 mmol/L         Comment: Specimen hemolyzed.  Result may be falsely elevated.          Chloride 106 mmol/L         CO2 25.5 mmol/L         Calcium 8.1 mg/dL         BUN/Creatinine Ratio 17.5       Anion Gap 9.5 mmol/L         eGFR 55.9 mL/min/1.73       Narrative:       GFR Normal >60  Chronic Kidney Disease <60  Kidney Failure <15     The GFR formula is only valid for adults with stable renal function between ages 18 and 70.     Urinalysis, Microscopic Only - Straight Cath [[id number]]  (Abnormal) Collected: 08/01/24 0207     Specimen: Urine from Straight Cath Updated: 08/01/24 0224       RBC, UA Too Numerous to Count /HPF         WBC, UA 3-5 /HPF         Comment: Urine culture not indicated.          Bacteria, UA None Seen /HPF         Squamous Epithelial Cells, UA 3-6 /HPF         Hyaline Casts, UA 3-6 /LPF         Methodology Manual Light Microscopy     High Sensitivity Troponin T 2Hr [[id number]]  (Abnormal) Collected: 08/01/24 0147     Specimen: Blood Updated: 08/01/24 0221       HS Troponin T 137 ng/L          Troponin T Delta 27 ng/L       Narrative:       High Sensitive Troponin T Reference Range:  <14.0 ng/L- Negative Female for AMI  <22.0 ng/L- Negative Male for AMI  >=14 - Abnormal Female indicating possible myocardial injury.  >=22 - Abnormal Male indicating possible myocardial injury.   Clinicians would have to utilize clinical acumen, EKG, Troponin, and serial changes to determine if it is an Acute Myocardial Infarction or myocardial injury due to an underlying chronic condition.           Urinalysis With Culture If Indicated - Straight Cath [[id number]]  (Abnormal) Collected: 08/01/24 0207     Specimen: Urine from Straight Cath Updated: 08/01/24 0215       Color, UA Dark Yellow       Appearance, UA Clear       pH, UA 5.5       Specific Gravity, UA 1.013       Glucose, UA Negative       Ketones, UA Trace       Bilirubin, UA Negative       Blood, UA Large (3+)       Protein,  mg/dL (2+)       Leuk Esterase, UA Trace       Nitrite, UA Negative       Urobilinogen, UA 1.0 E.U./dL     Narrative:       In absence of clinical symptoms, the presence of pyuria, bacteria, and/or nitrites on the urinalysis result does not correlate with infection.     Blood Culture - Blood, Arm, Left [[id number]] Collected: 08/01/24 0147     Specimen: Blood from Arm, Left Updated: 08/01/24 0155     Comprehensive Metabolic Panel [[id number]]  (Abnormal) Collected: 08/01/24 0014     Specimen: Blood Updated: 08/01/24 0053       Glucose 86 mg/dL         BUN 22 mg/dL         Creatinine 1.41 mg/dL         Sodium 139 mmol/L         Potassium 4.2 mmol/L         Comment: Slight hemolysis detected by analyzer. Result may be falsely elevated.          Chloride 104 mmol/L         CO2 24.4 mmol/L         Calcium 9.1 mg/dL         Total Protein 6.1 g/dL         Albumin 3.5 g/dL         ALT (SGPT) 10 U/L         AST (SGOT) 32 U/L         Comment: Slight hemolysis detected by analyzer. Result may be falsely elevated.          Alkaline Phosphatase 149 U/L          Total Bilirubin 0.7 mg/dL         Globulin 2.6 gm/dL         A/G Ratio 1.3 g/dL         BUN/Creatinine Ratio 15.6       Anion Gap 10.6 mmol/L         eGFR 48.8 mL/min/1.73       Narrative:       GFR Normal >60  Chronic Kidney Disease <60  Kidney Failure <15     The GFR formula is only valid for adults with stable renal function between ages 18 and 70.     Single High Sensitivity Troponin T [[id number]]  (Abnormal) Collected: 08/01/24 0014     Specimen: Blood Updated: 08/01/24 0053       HS Troponin T 110 ng/L       Narrative:       High Sensitive Troponin T Reference Range:  <14.0 ng/L- Negative Female for AMI  <22.0 ng/L- Negative Male for AMI  >=14 - Abnormal Female indicating possible myocardial injury.  >=22 - Abnormal Male indicating possible myocardial injury.   Clinicians would have to utilize clinical acumen, EKG, Troponin, and serial changes to determine if it is an Acute Myocardial Infarction or myocardial injury due to an underlying chronic condition.           CBC & Differential [[id number]]  (Abnormal) Collected: 08/01/24 0014     Specimen: Blood Updated: 08/01/24 0050     Narrative:       The following orders were created for panel order CBC & Differential.  Procedure                               Abnormality         Status                     ---------                               -----------         ------                     CBC Auto Differential[[id number]]        Abnormal            Final result               Scan Slide[[id number]]                                       Final result                  Please view results for these tests on the individual orders.     CBC Auto Differential [[id number]]  (Abnormal) Collected: 08/01/24 0014     Specimen: Blood Updated: 08/01/24 0050       WBC 8.95 10*3/mm3         RBC 3.85 10*6/mm3         Hemoglobin 11.4 g/dL         Hematocrit 37.1 %         MCV 96.4 fL         MCH 29.6 pg         MCHC 30.7 g/dL         RDW 15.7 %         RDW-SD 55.4 fl         MPV  10.0 fL         Platelets 129 10*3/mm3         Neutrophil % 87.3 %         Lymphocyte % 6.9 %         Monocyte % 4.9 %         Eosinophil % 0.3 %         Basophil % 0.2 %         Immature Grans % 0.4 %         Neutrophils, Absolute 7.80 10*3/mm3         Lymphocytes, Absolute 0.62 10*3/mm3         Monocytes, Absolute 0.44 10*3/mm3         Eosinophils, Absolute 0.03 10*3/mm3         Basophils, Absolute 0.02 10*3/mm3         Immature Grans, Absolute 0.04 10*3/mm3         nRBC 0.0 /100 WBC       Scan Slide [[id number]] Collected: 08/01/24 0014     Specimen: Blood Updated: 08/01/24 0050       Acanthocytes Slight/1+       Crenated RBC's Slight/1+       Poikilocytes Slight/1+       WBC Morphology Normal       Platelet Estimate Decreased     BNP [[id number]]  (Abnormal) Collected: 08/01/24 0014     Specimen: Blood Updated: 08/01/24 0048       proBNP 21,470.0 pg/mL       Narrative:       This assay is used as an aid in the diagnosis of individuals suspected of having heart failure. It can be used as an aid in the diagnosis of acute decompensated heart failure (ADHF) in patients presenting with signs and symptoms of ADHF to the emergency department (ED). In addition, NT-proBNP of <300 pg/mL indicates ADHF is not likely.     Age Range        Result Interpretation  NT-proBNP Concentration (pg/mL:        <50             Positive            >450                        [place]                        300-450                          Negative                        <300     50-75           Positive            >900                  Gray                300-900                  Negative            <300        >75             Positive            >1800                  Gray                300-1800                  Negative            <300     COVID-19,CEPHEID/DAVID,COR/KAYLYN/PAD/EDEN/LAG/SAFIA IN-HOUSE,NP SWAB IN TRANSPORT MEDIA 1 HR TAT, RT-PCR - Swab, Nasopharynx [[id number]]  (Normal) Collected: 08/01/24 0014     Specimen: Swab from Nasopharynx  Updated: 08/01/24 0043       COVID19 Not Detected     Narrative:       Fact sheet for providers: https://www.fda.gov/media/690380/download      Fact sheet for patients: https://www.fda.gov/media/611486/download  Fact sheet for providers: https://www.fda.gov/media/466107/download     Fact sheet for patients: https://www.fda.gov/media/121020/download     Test performed by PCR.     [place] Draw [[id number]] Collected: 08/01/24 0014     Specimen: Blood Updated: 08/01/24 0031     Narrative:       The following orders were created for panel order [place] Draw.  Procedure                               Abnormality         Status                     ---------                               -----------         ------                     Green Top (Gel)[[id number]]                                  Final result               Lavender Top[[id number]]                                     Final result               Gold Top - SST[[id number]]                                   Final result               Light Blue Top[[id number]]                                   Final result                  Please view results for these tests on the individual orders.     Green Top (Gel) [[id number]] Collected: 08/01/24 0014     Specimen: Blood Updated: 08/01/24 0031       Extra Tube Hold for add-ons.       Comment: Auto resulted.        Lavender Top [[id number]] Collected: 08/01/24 0014     Specimen: Blood Updated: 08/01/24 0031       Extra Tube hold for add-on       Comment: Auto resulted        Gold Top - SST [[id number]] Collected: 08/01/24 0014     Specimen: Blood Updated: 08/01/24 0031       Extra Tube Hold for add-ons.       Comment: Auto resulted.        Light Blue Top [[id number]] Collected: 08/01/24 0014     Specimen: Blood Updated: 08/01/24 0031       Extra Tube Hold for add-ons.       Comment: Auto resulted        POC Lactate [[id number]]  (Normal) Collected: 08/01/24 0020     Specimen: Blood Updated: 08/01/24 0022       Lactate 1.6 mmol/L          Comment: Serial Number: [id number]:  [id number]        Blood Gas, Arterial - [[id number]]  (Abnormal) Collected: 08/01/24 0014     Specimen: Arterial Blood Updated: 08/01/24 0021       Site Left Radial       Ash's Test Positive       pH, Arterial 7.339 pH units         pCO2, Arterial 48.9 mm Hg         pO2, Arterial 103.4 mm Hg         HCO3, Arterial 26.4 mmol/L         Base Excess, Arterial 0.2 mmol/L         Comment: Serial Number: [id number]:  [id number]          O2 Saturation, Arterial 97.5 %         CO2 Content 27.9 mmol/L         Barometric Pressure for Blood Gas --       Comment: N/A          Modality Cannula       FIO2 36 %         Hemodilution No       PO2/FIO2 287     Blood Culture - Blood, Arm, Right [[id number]] Collected: 08/01/24 0014     Specimen: Blood from Arm, Right Updated: 08/01/24 0021                Imaging Results (Last 24 Hours)         Procedure Component Value Units Date/Time     CT Head Without Contrast [[id number]] Collected: 08/01/24 0120       Updated: 08/01/24 0124     Narrative:       CT HEAD WO CONTRAST     Date of Exam: 8/1/2024 1:00 AM EDT     Indication: ams.     Comparison: 11/27/2023.     Technique: Axial CT images were obtained of the head without contrast administration.  Coronal reconstructions were performed.  Automated exposure control and iterative reconstruction methods were used.        Findings:  Superficial soft tissues appear within normal limits. Mild left ethmoid sinus and left maxillary sinus mucosal disease status post antrostomy. Mastoids are clear. Paranasal sinuses and mastoid air cells appear well aerated.  Orbits are unremarkable.    There is no acute intracranial hemorrhage.  No mass effect or midline shift.  No abnormal extra-axial collections.  Gray-white differentiation is within normal limits. Stable patchy white matter hypoattenuation; mild to moderate in magnitude. There is   generalized parenchymal volume loss congruent with age. There are small  chronic lacunar infarctions in the basal ganglia. Ventricular size and configuration is normal for age.        Impression:       Impression:  1.No acute intracranial abnormality.  2.Stable mild to moderate chronic small vessel ischemic change and generalized parenchymal volume loss.  3.Mild paranasal sinus mucosal disease status post antrostomy.           Electronically Signed: Sadiq Reza MD    8/1/2024 1:22 AM EDT    Workstation ID: [id number]     XR Chest 1 View [[id number]] Collected: 08/01/24 0057       Updated: 08/01/24 0100     Narrative:       XR CHEST 1 VW     Date of Exam: 8/1/2024 12:20 AM EDT     Indication: ams     Comparison: 6/27/2024     Findings:  There is worsened moderate pulmonary edema. There are stable small small bilateral pleural effusions. There is evidence of prior median sternotomy and CABG. No pneumothorax. Pulmonary vasculature is indistinct. Cardiac silhouette is partially obscured,   but likely unchanged from the prior study. No acute osseous abnormality.        Impression:       Impression:  Worsening moderate pulmonary edema with stable small bilateral pleural effusions.           Electronically Signed: Sadiq Reza MD    8/1/2024 12:58 AM EDT    Workstation ID: [id number]                LAB RESULTS (LAST 7 DAYS)     CBC       Results from last 7 days   Lab Units 08/01/24  0014   WBC 10*3/mm3 8.95   RBC 10*6/mm3 3.85*   HEMOGLOBIN g/dL 11.4*   HEMATOCRIT % 37.1*   MCV fL 96.4   PLATELETS 10*3/mm3 129*         BMP        Results from last 7 days   Lab Units 08/01/24  0417 08/01/24  0014   SODIUM mmol/L 141 139   POTASSIUM mmol/L 3.7 4.2   CHLORIDE mmol/L 106 104   CO2 mmol/L 25.5 24.4   BUN mg/dL 22 22   CREATININE mg/dL 1.26 1.41*   GLUCOSE mg/dL 85 86         CMP         Results from last 7 days   Lab Units 08/01/24  0417 08/01/24  0014   SODIUM mmol/L 141 139   POTASSIUM mmol/L 3.7 4.2   CHLORIDE mmol/L 106 104   CO2 mmol/L 25.5 24.4   BUN mg/dL 22 22   CREATININE mg/dL 1.26 1.41*    GLUCOSE mg/dL 85 86   ALBUMIN g/dL  --  3.5   BILIRUBIN mg/dL  --  0.7   ALK PHOS U/L  --  149*   AST (SGOT) U/L  --  32   ALT (SGPT) U/L  --  10         BNP         TROPONIN       Results from last 7 days   Lab Units 08/01/24  0147   HSTROP T ng/L 137*         CoAg         Creatinine Clearance  Estimated Creatinine Clearance: 35 mL/min (by C-G formula based on SCr of 1.26 mg/dL).     ABG       Results from last 7 days   Lab Units 08/01/24  0014   PH, ARTERIAL pH units 7.339*   PCO2, ARTERIAL mm Hg 48.9*   PO2 ART mm Hg 103.4   O2 SATURATION ART % 97.5   BASE EXCESS ART mmol/L 0.2            Radiology  CT Head Without Contrast     Result Date: 8/1/2024  Impression: 1.No acute intracranial abnormality. 2.Stable mild to moderate chronic small vessel ischemic change and generalized parenchymal volume loss. 3.Mild paranasal sinus mucosal disease status post antrostomy. Electronically Signed: Sadiq Reza MD  8/1/2024 1:22 AM EDT  Workstation ID: [id number]     XR Chest 1 View     Result Date: 8/1/2024  Impression: Worsening moderate pulmonary edema with stable small bilateral pleural effusions. Electronically Signed: Sadiq Reza MD  8/1/2024 12:58 AM EDT  Workstation ID: [id number]         EKG  I personally viewed and interpreted the patient's EKG/Telemetry data:  ECG 12 Lead Other; Sepsis   Final Result   HEART RATE=73  bpm   RR Mdaxzorn=833  ms   HI Jsfyxnzh=838  ms   P Horizontal Axis=  deg   P Front Axis=91  deg   QRSD Cnwtroqe=982  ms   QT Bpwixcue=179  ms   GBzV=964  ms   QRS Axis=82  deg   T Wave Axis=131  deg   - ABNORMAL ECG -   Sinus rhythm   LVH with secondary repolarization abnormality   When compared with ECG of 25-Feb-2020 13:18:04,   Significant repolarization change   Significant axis, voltage or hypertrophy change   Electronically Signed By: Michael Lozano (Dariusz) 2024-08-01 06:23:55   Date and Time of Study:2024-08-01 00:07:06       Telemetry Scan   Final Result       Telemetry Scan   Final Result                         Echocardiogram:             Stress Test:           Cardiac Catheterization:  No results found for this or any previous visit.           Other:        ASSESSMENT & PLAN:     Principal Problem:    CHF exacerbation  Active Problems:    CAD (coronary artery disease)    Depression, unspecified    Heart failure, unspecified    Chronic obstructive pulmonary disease, unspecified        HFrEF  Previous echocardiogram EF 40 to 45%, now down to around 30% with regional abnormalities as described, akinesis of the posterior wall inferolateral and high lateral wall, global hypokinesis otherwise  Elevated filling pressures, grade 2 diastolic dysfunction  Mild pulmonary hypertension  Likely low gradient severe arctic stenosis with moderate to severe mitral valve regurgitation, LV enlargement     Elevated troponin  High-sensitivity troponin x 2:110, 137  EKG with no acute ST or T wave segment abnormalities  Patient denies chest pain  Likely secondary to demand ischemia, strain and elevated filling pressures     Multivessel coronary artery disease  Previous remote CABG  Reported post CABG PCI  Last ischemic evaluation appears to be cardiac catheterization in 2021 with patent LIMA to the LAD,  Saphenous vein graft to the diagonal patent, saphenous vein graft to the OM patent.  30% RCA stenosis  Secondary prevention goals  Statin     Reported bradycardia  Heart rate currently mid 50s     Reported hypotension, keep systolic greater than 110 with significant aortic valve stenosis  On oral diuresis     Mental status changes  Etiology unclear, multifactorial  Head CT with no acute findings    Anemia / hematuria  ASA has been held         Plan:   Over the weekend coreg was resumed along with low dose lisinopril  Reduce coreg to 3.125 as patient has HR in the 50s  Off midodrine  Tolerating lisinopril  Off lasix currently, SRIKANTH improving  Nephrology to see patient  Continue oral lasix  Monitor b/p, need to avoid hypotension  with aortic stenosis    Will need outpt cardiology f/u with primary cardiologist for TAVR w/u with viry  Patient discharging to rehab today    COLE Esteves  08/06/24  10:43 EDT

## 2024-08-06 NOTE — PLAN OF CARE
Goal Outcome Evaluation:  Plan of Care Reviewed With: patient        Progress: no change  Outcome Evaluation: Pt denies pain so far this shift. POA with complaints of pt not eating much- boost given to pt. q2 turn. D/C to [facility] pending acceptance. on 2L oxygen. Will monitor.

## 2024-08-06 NOTE — PLAN OF CARE
"Goal Outcome Evaluation:               Assessment: Andrea Luna presents with functional mobility impairments which indicate the need for skilled intervention. Tolerating session today without incident. Pt motivated/eager to participate. Pt remains below functional baseline and would benefit from continued skilled PT services upon d/c in order to obtain highest functional potential, lessen caregiver burden, and to decrease fall risk. Will continue to follow and progress as tolerated.     Plan/Recommendations:   If medically appropriate, Moderate Intensity Therapy recommended post-acute care. This is recommended as therapy feels the patient would require 3-4 days per week and wouldn't tolerate \"3 hour daily\" rehab intensity. SNF would be the preferred choice. If the patient does not agree to SNF, arrange HH or OP depending on home bound status. If patient is medically complex, consider LTACH. Pt requires no DME at discharge.     Pt desires Skilled Rehab placement at discharge. Pt cooperative; agreeable to therapeutic recommendations and plan of care.                                "

## 2024-08-06 NOTE — PROGRESS NOTES
[facility] MEDICINE SERVICE  DAILY PROGRESS NOTE    NAME: Andrea Luna  : 1940  MRN: [id number]      LOS: 2 days     PROVIDER OF SERVICE: Blank Samuel MD    Chief Complaint: CHF exacerbation    Subjective:     No new complaint    Review of Systems:   Denies fevers, chills  Denies chest pain, edema, palpitations  Improved shortness of breath, denies cough  Denies nausea, vomiting, diarrhea  Denies dysuria, hematuria    Objective:     Vital Signs  Temp:  [97.5 °F (36.4 °C)-98.4 °F (36.9 °C)] 98.4 °F (36.9 °C)  Heart Rate:  [57-67] 57  Resp:  [13-17] 17  BP: (146-160)/(78-84) 153/82  Flow (L/min):  [2] 2   Body mass index is 20.32 kg/m².    Physical Exam  General: No acute distress, appears chronically ill, hard of hearing  Neuro: Awake and alert, oriented x3, no focal deficits appreciated  HEENT: EOMI, moist mucus membranes  CV: RRR, no murmurs appreciated, no peripheral edema  Pulm: CTAB, no increased work of breathing  Abd: Soft, nontender, nondistended  Skin: Warm, dry and intact  Psych: Appropriate mood and affect    Scheduled Meds   [Held by provider] aspirin, 81 mg, Oral, Daily  atorvastatin, 40 mg, Oral, Nightly  carvedilol, 3.125 mg, Oral, BID With Meals  furosemide, 20 mg, Oral, Daily  lisinopril, 2.5 mg, Oral, Q24H  multivitamin with minerals, 1 tablet, Oral, Daily  pantoprazole, 40 mg, Oral, Q AM  sertraline, 25 mg, Oral, Daily  sodium chloride, 10 mL, Intravenous, Q12H  traMADol, 50 mg, Oral, Nightly       PRN Meds     acetaminophen **OR** acetaminophen **OR** acetaminophen    albuterol    senna-docusate sodium **AND** polyethylene glycol **AND** bisacodyl **AND** bisacodyl    melatonin    nitroglycerin    ondansetron    Potassium Replacement - Follow Nurse / BPA Driven Protocol    sodium chloride    sodium chloride    sodium chloride   Infusions         Diagnostic Data    Results from last 7 days   Lab Units 24  0028 08/04/24  1020   WBC 10*3/mm3 3.59  --    HEMOGLOBIN  g/dL 8.9*  --    HEMATOCRIT % 30.1*  --    PLATELETS 10*3/mm3 134*  --    GLUCOSE mg/dL 113* 110*   CREATININE mg/dL 1.12 1.11   BUN mg/dL 24* 22   SODIUM mmol/L 143 142   POTASSIUM mmol/L 4.4 3.1*   AST (SGOT) U/L  --  27   ALT (SGPT) U/L  --  13   ALK PHOS U/L  --  139*   BILIRUBIN mg/dL  --  0.4   ANION GAP mmol/L 5.8 6.2       No radiology results for the last day    Interval results reviewed.    Assessment/Plan:     Active and Resolved Problems  Active Hospital Problems    Diagnosis  POA    **CHF exacerbation [I50.9]  Yes    Severe malnutrition [E43]  Yes    CAD (coronary artery disease) [I25.10]  Yes    Heart failure, unspecified [I50.9]  Yes    Chronic obstructive pulmonary disease, unspecified [J44.9]  Yes    Depression, unspecified [F32.A]  Yes      Resolved Hospital Problems   No resolved problems to display.     #Acute on chronic systolic heart failure  - Cardiology consulted, appreciate recs  - EF 30%, G2DD, severe AS  - Continue diuresis with lasix 20 IV bid  - Wean oxygen as able    #Hypertension  -Blood pressure is uncontrolled.  Increase lisinopril dose.    #SRIKANTH  -Resolved     #Gross hematuria  #Anemia  - Urology consulted, appreciate recs  - CT w/ lesion in L kidney; will require outpatient follow up  - Cystoscopy w/ trauma and mild stricture noted; no intervention at this time  - Hgb stable, morning CBC     #Bradycardia  -improved at this time.  Cardiology following    #Severe aortic stenosis  - Needs to follow up with [facility] for further TAVR eval after discharge     #Elevated troponin  - 110>137; EKG without acute ST changes   - Likely secondary to demand ischemia.  Cardiology following     #CAD  #Hyperlipidemia  - s/p CABG  - Continue statin; hold asa in setting of gross hematuria w/ drop in hgb with plans to resume if hgb remains stable     #Depression  - Continue sertraline    VTE Prophylaxis:  Mechanical VTE prophylaxis orders are present.         Code status is   Code Status and Medical  Interventions: CPR (Attempt to Resuscitate); Full Support   Ordered at: 08/01/24 0326     Code Status (Patient has no pulse and is not breathing):    CPR (Attempt to Resuscitate)     Medical Interventions (Patient has pulse or is breathing):    Full Support         Time: 35 minutes    Signature: Electronically signed by Blank Samuel MD, 08/05/24, 21:03 EDT.  [facility]

## 2024-08-06 NOTE — CASE MANAGEMENT/SOCIAL WORK
Case Management Discharge Note      Final Note: ebenezer Chavez.    Selected Continued Care - Discharged on 8/6/2024 Admission date: 7/31/2024 - Discharge disposition: Skilled Nursing Facility (DC - External)      Destination Coordination complete.      Service Provider Selected Services Address Phone Fax Patient Preferred    [facility] [address] [facility] [place] [address] [phone] --             Transportation Services  W/C Van: Skilled Nursing Facility Van    Final Discharge Disposition Code: 03 - skilled nursing facility (SNF)

## 2024-08-07 NOTE — DISCHARGE SUMMARY
[facility] Medicine Services  Discharge Summary    Date of Service: 2024  Patient Name: Andrea Luna  : 1940  MRN: [id number]    Date of Admission: 2024  Discharge Diagnosis:  Acute systolic CHF exacerbation EF of 31-35%%  Hypertension  Acute kidney injury  Hyperlipidemia    Date of Discharge: 2024    Primary Care Physician: Emily Barahona DO      Presenting Problem:   CHF exacerbation [I50.9]  Altered mental status, unspecified altered mental status type [R41.82]  Acute on chronic congestive heart failure, unspecified heart failure type [I50.9]    Active and Resolved Hospital Problems:  Active Hospital Problems    Diagnosis POA    **CHF exacerbation [I50.9] Yes    Severe malnutrition [E43] Yes    CAD (coronary artery disease) [I25.10] Yes    Heart failure, unspecified [I50.9] Yes    Chronic obstructive pulmonary disease, unspecified [J44.9] Yes    Depression, unspecified [F32.A] Yes      Resolved Hospital Problems   No resolved problems to display.         Hospital Course       Hospital Course:  This patient is an 84-year-old gentleman who was admitted and treated for acute CHF exacerbation.  He was started on IV diuretics and medical management was optimized for CHF exacerbation.  He was seen by cardiology in consult.  An echocardiogram that was done showed an ejection fraction of 31 to 35%.  He developed acute kidney injury and diuretics were held.  He was comanaged with nephrology.  He gradually improved and eventually becoming euvolemic with regard to congestive heart failure exacerbation.  His renal function improved off of diuretics and he was started on low-dose Lasix.  He was seen by physical therapy and outpatient therapy during hospital course and their recommendation was for discharge to subacute rehab/SNF.  He has a bed available and he is being discharged to SNF today.        DISCHARGE Follow Up Recommendations for labs and diagnostics: Follow-up with  PCP in 1 week, follow-up with nephrology in 1 week, follow-up with cardiology in 1 week.      Reasons For Change In Medications and Indications for New Medications:      Day of Discharge     Vital Signs:       Physical Exam:  Physical Exam  Constitutional:       Appearance: Normal appearance.   HENT:      Head: Normocephalic and atraumatic.      Nose: Nose normal.      Mouth/Throat:      Mouth: Mucous membranes are moist.   Eyes:      Extraocular Movements: Extraocular movements intact.      Conjunctiva/sclera: Conjunctivae normal.      Pupils: Pupils are equal, round, and reactive to light.   Cardiovascular:      Rate and Rhythm: Normal rate and regular rhythm.      Pulses: Normal pulses.      Heart sounds: Normal heart sounds.   Pulmonary:      Effort: Pulmonary effort is normal.      Breath sounds: Normal breath sounds.   Abdominal:      General: Abdomen is flat. Bowel sounds are normal.      Palpations: Abdomen is soft.   Musculoskeletal:         General: Normal range of motion.      Cervical back: Normal range of motion and neck supple.   Skin:     General: Skin is warm and dry.      Capillary Refill: Capillary refill takes less than 2 seconds.   Neurological:      General: No focal deficit present.      Mental Status: He is alert and oriented to person, place, and time.   Psychiatric:         Mood and Affect: Mood normal.         Behavior: Behavior normal.         Thought Content: Thought content normal.         Judgment: Judgment normal.           Pertinent  and/or Most Recent Results     LAB RESULTS:      Lab 08/05/24  0028 08/04/24  0406 08/03/24  0443 08/02/24  0306 08/01/24  0020 08/01/24  0014   WBC 3.59 3.91 4.37 4.71  --  8.95   HEMOGLOBIN 8.9* 9.8* 9.7* 9.2*  --  11.4*   HEMATOCRIT 30.1* 32.8* 32.3* 30.1*  --  37.1*   PLATELETS 134* 144 131* 113*  --  129*   NEUTROS ABS 2.06  --  3.15  --   --  7.80*   IMMATURE GRANS (ABS) 0.01  --  0.01  --   --  0.04   LYMPHS ABS 0.99  --  0.69*  --   --  0.62*    MONOS ABS 0.31  --  0.32  --   --  0.44   EOS ABS 0.19  --  0.18  --   --  0.03   MCV 98.7* 97.3* 97.9* 97.1*  --  96.4   LACTATE  --   --   --   --  1.6  --          Lab 08/05/24  0028 08/04/24  1020 08/03/24  0443 08/02/24  0306 08/01/24  0417   SODIUM 143 142 142 139 141   POTASSIUM 4.4 3.1* 3.4* 3.4* 3.7   CHLORIDE 103 100 104 103 106   CO2 34.2* 35.8* 31.0* 28.7 25.5   ANION GAP 5.8 6.2 7.0 7.3 9.5   BUN 24* 22 30* 31* 22   CREATININE 1.12 1.11 1.46* 1.56* 1.26   EGFR 64.4 65.1 46.8* 43.3* 55.9*   GLUCOSE 113* 110* 86 88 85   CALCIUM 8.7 9.0 8.8 8.6 8.1*         Lab 08/04/24  1020 08/01/24  0014   TOTAL PROTEIN 5.6* 6.1   ALBUMIN 3.4* 3.5   GLOBULIN 2.2 2.6   ALT (SGPT) 13 10   AST (SGOT) 27 32   BILIRUBIN 0.4 0.7   ALK PHOS 139* 149*         Lab 08/01/24  0147 08/01/24  0014   PROBNP  --  21,470.0*   HSTROP T 137* 110*                 Lab 08/01/24  0014   PH, ARTERIAL 7.339*   PCO2, ARTERIAL 48.9*   PO2 .4   O2 SATURATION ART 97.5   FIO2 36   HCO3 ART 26.4   BASE EXCESS ART 0.2     Brief Urine Lab Results  (Last result in the past 365 days)        Color   Clarity   Blood   Leuk Est   Nitrite   Protein   CREAT   Urine HCG        08/04/24 1731             33.1               Microbiology Results (last 10 days)       Procedure Component Value - Date/Time    Blood Culture - Blood, Arm, Left [[id number]]  (Normal) Collected: 08/01/24 0147    Lab Status: Final result Specimen: Blood from Arm, Left Updated: 08/06/24 0200     Blood Culture No growth at 5 days    COVID-19,CEPHEID/DAVID,COR/KAYLYN/PAD/EDEN/LAG/SAFIA IN-HOUSE,NP SWAB IN TRANSPORT MEDIA 1 HR TAT, RT-PCR - Swab, Nasopharynx [[id number]]  (Normal) Collected: 08/01/24 0014    Lab Status: Final result Specimen: Swab from Nasopharynx Updated: 08/01/24 0043     COVID19 Not Detected    Narrative:      Fact sheet for providers: https://www.fda.gov/media/411331/download     Fact sheet for patients: https://www.fda.gov/media/339440/download  Fact sheet for  providers: https://www.fda.gov/media/267548/download    Fact sheet for patients: https://www.fda.gov/media/396728/download    Test performed by PCR.    Blood Culture - Blood, Arm, Right [[id number]]  (Normal) Collected: 08/01/24 0014    Lab Status: Final result Specimen: Blood from Arm, Right Updated: 08/06/24 0030     Blood Culture No growth at 5 days            CT Abdomen Pelvis With & Without Contrast    Result Date: 8/2/2024  Impression: Impression: 1. Indeterminate 1.9 cm lesion at the lateral cortical margin of the right mid kidney. Given its stability and size and morphology since the CT abdomen of 1/6/2020, it would favor a benign etiology. However, on today's examination, it does appear to demonstrate contrast enhancement, which would favor malignant etiology. Any further decision as to whether to continue imaging surveillance versus soft tissue biopsy should be based upon clinical assessment for this 84-year-old patient. 2. Bilateral renal cysts. 3. Heavy calcific atherosclerosis within the bilateral renal arteries, left greater than right. Left renal artery stenosis not excluded. 4.. Mild to moderate left renal atrophy with multifocal cortical scarring. 5. Tiny nonobstructing right renal stone. 6. Moderate right pleural effusion with right basilar atelectasis. Chronic left basilar pleural effusion and chronic rounded to left basilar atelectasis. 7. Sigmoid colonic diverticulosis. Electronically Signed: Abigail William MD  8/2/2024 10:29 AM EDT  Workstation ID: [id number]    CT Head Without Contrast    Result Date: 8/1/2024  Impression: Impression: 1.No acute intracranial abnormality. 2.Stable mild to moderate chronic small vessel ischemic change and generalized parenchymal volume loss. 3.Mild paranasal sinus mucosal disease status post antrostomy. Electronically Signed: Sadiq Reza MD  8/1/2024 1:22 AM EDT  Workstation ID: [id number]    XR Chest 1 View    Result Date: 8/1/2024  Impression: Impression:  Worsening moderate pulmonary edema with stable small bilateral pleural effusions. Electronically Signed: Sadiq Reza MD  8/1/2024 12:58 AM EDT  Workstation ID: [id number]             Results for orders placed during the hospital encounter of 07/31/24    Adult Transthoracic Echo Complete W/ Cont if Necessary Per Protocol    Interpretation Summary    Left ventricular systolic function is normal. Left ventricular ejection fraction appears to be 31 - 35%.    The left ventricular cavity is borderline dilated.    Left ventricular wall thickness is consistent with mild concentric hypertrophy.    Left ventricular diastolic function is consistent with (grade II w/high LAP) pseudonormalization.    Mildly reduced right ventricular systolic function noted.    The right ventricular cavity is borderline dilated.    The left atrial cavity is severely dilated.    Left atrial volume is severely increased.    The right atrial cavity is severely  dilated.    Severe aortic valve stenosis is present.    Abnormal mitral valve structure consistent with dilated annulus.    Moderate to severe mitral valve regurgitation is present with an eccentric jet noted.    Estimated right ventricular systolic pressure from tricuspid regurgitation is mildly elevated (35-45 mmHg).    Mild pulmonary hypertension is present.      Labs Pending at Discharge:      Procedures Performed    08/05 1132 Walking Oximetry      Consults:   Consults       Date and Time Order Name Status Description    8/2/2024  1:49 AM Inpatient Urology Consult Completed     8/1/2024  2:32 AM Inpatient Cardiology Consult Completed               Discharge Details        Discharge Medications        New Medications        Instructions Start Date   lisinopril 5 MG tablet  Commonly known as: PRINIVIL,ZESTRIL   5 mg, Oral, Every 24 Hours Scheduled             Changes to Medications        Instructions Start Date   furosemide 20 MG tablet  Commonly known as: LASIX  What changed: when to  take this   20 mg, Oral, Daily             Continue These Medications        Instructions Start Date   albuterol sulfate  (90 Base) MCG/ACT inhaler  Commonly known as: PROVENTIL HFA;VENTOLIN HFA;PROAIR HFA   2 puffs, Inhalation, Every 4 Hours PRN      aspirin 81 MG chewable tablet   81 mg, Oral, Daily      atorvastatin 40 MG tablet  Commonly known as: LIPITOR   40 mg, Oral, Nightly      carvedilol 6.25 MG tablet  Commonly known as: COREG   6.25 mg, Oral, 2 Times Daily With Meals      docusate sodium 100 MG capsule  Commonly known as: COLACE   100 mg, Oral, Daily      Ferrous Sulfate 220 (44 Fe) MG/5ML oral solution   220 mg, Oral, Daily      lactulose 10 GM/15ML solution  Commonly known as: CHRONULAC   20 g, Oral, 2 Times Daily PRN      MiraLax 17 g packet  Generic drug: polyethylene glycol   17 g, Oral, Daily      multivitamin with minerals tablet tablet   1 tablet, Oral, Daily      omeprazole 20 MG capsule  Commonly known as: priLOSEC   20 mg, Oral, Daily      ondansetron 4 MG tablet  Commonly known as: ZOFRAN   4 mg, Oral, Every 4 Hours PRN      potassium chloride 20 MEQ CR tablet  Commonly known as: KLOR-CON M20   20 mEq, Oral, Daily      sertraline 25 MG tablet  Commonly known as: ZOLOFT   25 mg, Oral, Daily      traMADol 50 MG tablet  Commonly known as: ULTRAM   50 mg, Oral, Nightly               Allergies   Allergen Reactions    Codeine Rash    Latex Rash     Other reaction(s): ; 5/3/2006 11:36:20 AM    Sulfa Antibiotics Rash         Discharge Disposition: Discharge to SNF/[facility]  Skilled Nursing Facility (DC - External)    Diet: Cardiac diet  Hospital:No active diet order        Discharge Activity:   Activity Instructions       Activity as Tolerated                CODE STATUS:  Code Status and Medical Interventions: CPR (Attempt to Resuscitate); Full Support   Ordered at: 08/01/24 0326     Code Status (Patient has no pulse and is not breathing):    CPR (Attempt to Resuscitate)     Medical Interventions  (Patient has pulse or is breathing):    Full Support         No future appointments.    Additional Instructions for the Follow-ups that You Need to Schedule       Discharge Follow-up with PCP   As directed       Currently Documented PCP:    Emily Barahona DO    PCP Phone Number:    [phone]     Follow Up Details: 1 week        Discharge Follow-up with Specified Provider: Cardiology; 1 Week   As directed      To: Cardiology   Follow Up: 1 Week        Discharge Follow-up with Specified Provider: Nephrology; 2 Weeks   As directed      To: Nephrology   Follow Up: 2 Weeks                Time spent on Discharge including face to face service:  >30 minutes    Signature: Electronically signed by Blank Samuel MD, 08/07/24, 05:50 EDT.  [facility] Hospitalist Team

## 2024-08-08 NOTE — PROGRESS NOTES
"Enter Query Response Below      Query Response: chronic respiratory failure with hypoxia             If applicable, please update the problem list.     Patient: Andrea Luna        : 1940  Account: [id number]           Admit Date:         How to Respond to this query:       a. Click New Note     b. Answer query within the yellow box.                c. Update the Problem List, if applicable.      If you have any questions about this query contact me at: [email or web address]     Ms. Mckay,     84-year-old male with history of COPD admitted 2024 with acute on chronic CHF per H&P.  H&P also reports \"He is on chronic O2 at 4 L per NC at all times, SpO2 is 100%.\"  Patient maintaining oxygen saturations between % on 2-4L via nasal cannula throughout stay so far.   Treatment has included: Supplemental oxygen up to 4L NC.     Please specify the oxygen (dependence/continuous use) as:    -Chronic respiratory failure with hypoxia  -Other (other specify) ___________  -Unable to clinically determine    By submitting this query, we are merely seeking further clarification of documentation to accurately reflect all conditions that you are monitoring, evaluating, treating or that extend the hospitalization or utilize additional resources of care. Please utilize your independent clinical judgment when addressing the question(s) above.     This query and your response, once completed, will be entered into the legal medical record.    Sincerely,  MAGDIEL MuñozN, RN   Clinical Documentation Integrity Program     "

## 2024-08-17 NOTE — PROGRESS NOTES
"Enter Query Response Below      Query Response: atn unable to determine.             If applicable, please update the problem list.     Patient: Andrea Luna        : 1940  Account: [id number]           Admit Date: 2024        How to Respond to this query:       a. Click New Note     b. Answer query within the yellow box.                c. Update the Problem List, if applicable.      If you have any questions about this query contact me at: [email or web address]     Dr. Degroot,     84-year-old male admitted 2024 with acute on chronic CHF per H&P.  Nephrology Consult and Progress Notes - include \"Acute kidney injury, baseline serum creatinine is 1.2, creatinine in outlying  hospital was 2.1, scr now is 1.68, 147, EGFR 40.  On the background history of of poor oral intake, hypotension, and COVID infection, worsening of renal function multifactorial, possible ATN.\", \"Renal US: Right kidney 9.1 cm, left kidney 7.5 cm, simple cyst of both kidneys, no masses no calculi, no hydronephrosis\", \"UA unremarkable\", and \"Serology reviewed: ORTEGA screen negative, normal complements, free kappa 38, free lambda 18, KL ratio 2.0, no monoclonal protein detected\".  Creatinine: 1.41 ( 0014), 1.26 ( 0417), 1.56 (), 1.46 (8/3), 1.11 (), 1.12 ().  Urine Sodium: 108 ().  Treatment included: Nephrology Consult, monitoring of labs, IV Lasix, PO Lasix, and nephrotoxic agents avoided. IV Lasix noted to be held starting  and PO Lasix started .    Please clarify the following:    ATN ruled in  ATN ruled out  Other (please specify) ______  Unable to determine    By submitting this query, we are merely seeking further clarification of documentation to accurately reflect all conditions that you are monitoring, evaluating, treating or that extend the hospitalization or utilize additional resources of care. Please utilize your independent clinical judgment when addressing the question(s) above.     This " query and your response, once completed, will be entered into the legal medical record.    Sincerely,  MAGDIEL MuñozN, RN   Clinical Documentation Integrity Program